# Patient Record
Sex: FEMALE | Race: WHITE | NOT HISPANIC OR LATINO | Employment: FULL TIME | URBAN - NONMETROPOLITAN AREA
[De-identification: names, ages, dates, MRNs, and addresses within clinical notes are randomized per-mention and may not be internally consistent; named-entity substitution may affect disease eponyms.]

---

## 2017-03-22 ENCOUNTER — OFFICE VISIT (OUTPATIENT)
Dept: MEDICAL GROUP | Facility: PHYSICIAN GROUP | Age: 22
End: 2017-03-22
Payer: OTHER GOVERNMENT

## 2017-03-22 VITALS
RESPIRATION RATE: 16 BRPM | BODY MASS INDEX: 25.66 KG/M2 | DIASTOLIC BLOOD PRESSURE: 62 MMHG | WEIGHT: 154 LBS | HEART RATE: 76 BPM | OXYGEN SATURATION: 99 % | TEMPERATURE: 97.9 F | SYSTOLIC BLOOD PRESSURE: 100 MMHG | HEIGHT: 65 IN

## 2017-03-22 DIAGNOSIS — Z30.40 ENCOUNTER FOR SURVEILLANCE OF CONTRACEPTIVES: ICD-10-CM

## 2017-03-22 DIAGNOSIS — J45.40 MODERATE PERSISTENT ASTHMA WITHOUT COMPLICATION: ICD-10-CM

## 2017-03-22 PROCEDURE — 99214 OFFICE O/P EST MOD 30 MIN: CPT | Performed by: NURSE PRACTITIONER

## 2017-03-22 RX ORDER — NORELGESTROMIN AND ETHINYL ESTRADIOL 35; 150 UG/MG; UG/MG
1 PATCH TRANSDERMAL
Qty: 12 PATCH | Refills: 4 | Status: SHIPPED
Start: 2017-03-22 | End: 2019-03-05

## 2017-03-22 RX ORDER — NORELGESTROMIN AND ETHINYL ESTRADIOL 35; 150 UG/MG; UG/MG
1 PATCH TRANSDERMAL
Qty: 12 PATCH | Refills: 4 | Status: SHIPPED | OUTPATIENT
Start: 2017-03-22 | End: 2017-03-22 | Stop reason: SDUPTHER

## 2017-03-22 RX ORDER — FLUTICASONE PROPIONATE 44 UG/1
2 AEROSOL, METERED RESPIRATORY (INHALATION) 2 TIMES DAILY
Qty: 3 INHALER | Refills: 3 | Status: SHIPPED
Start: 2017-03-22 | End: 2017-12-01

## 2017-03-22 RX ORDER — FLUTICASONE PROPIONATE 44 UG/1
2 AEROSOL, METERED RESPIRATORY (INHALATION) 2 TIMES DAILY
Qty: 3 INHALER | Refills: 3 | Status: SHIPPED | OUTPATIENT
Start: 2017-03-22 | End: 2017-03-22 | Stop reason: SDUPTHER

## 2017-03-22 RX ORDER — ALBUTEROL SULFATE 90 UG/1
2 AEROSOL, METERED RESPIRATORY (INHALATION) EVERY 6 HOURS PRN
Qty: 2 INHALER | Refills: 1 | Status: SHIPPED
Start: 2017-03-22 | End: 2017-12-01

## 2017-03-22 RX ORDER — NORELGESTROMIN AND ETHINYL ESTRADIOL 35; 150 UG/MG; UG/MG
1 PATCH TRANSDERMAL
Qty: 12 PATCH | Refills: 4 | Status: SHIPPED | OUTPATIENT
Start: 2017-03-22

## 2017-03-22 RX ORDER — ALBUTEROL SULFATE 90 UG/1
2 AEROSOL, METERED RESPIRATORY (INHALATION) EVERY 6 HOURS PRN
Qty: 2 INHALER | Refills: 1 | Status: SHIPPED | OUTPATIENT
Start: 2017-03-22 | End: 2017-03-22 | Stop reason: SDUPTHER

## 2017-03-22 NOTE — PATIENT INSTRUCTIONS
Birth control refilled, take as prescribed    Restart taking Flovent, 2 puffs twice a day, rinse out mouth after use    Refilled albuterol, take as directed, the goal is to use this rarely    Follow up with me in 3 months

## 2017-03-22 NOTE — MR AVS SNAPSHOT
"        Kirsty Frantz   3/22/2017 4:00 PM   Office Visit   MRN: [id number]    Department:  [facility]   Dept Phone:  [phone]    Description:  Female : 1995   Provider:  RAJIV Napoles           Reason for Visit     Medication Refill BC patch, albuterol      Allergies as of 3/22/2017     Allergen Noted Reactions    Cillins [Penicillins] 2011       Codeine 2011       Sulfa Drugs 2011         You were diagnosed with     Encounter for surveillance of contraceptives   [612362]       Moderate persistent asthma without complication   [978807]         Vital Signs     Blood Pressure Pulse Temperature Respirations Height Weight    100/62 mmHg 76 36.6 °C (97.9 °F) 16 1.651 m (5' 5\") 69.854 kg (154 lb)    Body Mass Index Oxygen Saturation Last Menstrual Period Smoking Status          25.63 kg/m2 99% 2017 Never Smoker         Basic Information     Date Of Birth Sex Race Ethnicity Preferred Language    1995 Female White Non- English      Problem List              ICD-10-CM Priority Class Noted - Resolved    Family history of early CAD Z82.49   3/7/2011 - Present    History of depression Z86.59   3/7/2011 - Present    Moderate persistent asthma J45.40   Unknown - Present    Bilateral chronic knee pain M25.561, M25.562, G89.29   10/22/2013 - Present    Acute low back pain M54.5   2014 - Present    Encounter for routine gynecological examination Z01.419   2015 - Present    Moderate cervical dysplasia N87.1   2016 - Present    Encounter for surveillance of contraceptives Z30.40   2016 - Present    Bilateral foot pain M79.671, M79.672   2016 - Present    Need for HPV vaccination Z23   2016 - Present    Memory changes R41.3   10/12/2016 - Present      Health Maintenance        Date Due Completion Dates    IMM HEP B VACCINE (1 of 3 - Primary Series) 1995 ---    IMM HEP A VACCINE (1 of 2 - Standard Series) 1994 ---    IMM VARICELLA " (CHICKENPOX) VACCINE (1 of 2 - 2 Dose Adolescent Series) 2/25/2006 ---    IMM DTaP/Tdap/Td Vaccine (1 - Tdap) 2/25/2012 ---    IMM PNEUMOCOCCAL 19-64 (ADULT) MEDIUM RISK SERIES (1 of 1 - PPSV23) 2/25/2012 ---    IMM INFLUENZA (1) 9/1/2016 ---    PAP SMEAR 1/12/2018 1/12/2015            Current Immunizations     HPV 9-VALENT VACCINE (GARDASIL 9) 8/5/2016 11:15 AM, 4/1/2016, 2/1/2016  4:02 PM      Below and/or attached are the medications your provider expects you to take. Review all of your home medications and newly ordered medications with your provider and/or pharmacist. Follow medication instructions as directed by your provider and/or pharmacist. Please keep your medication list with you and share with your provider. Update the information when medications are discontinued, doses are changed, or new medications (including over-the-counter products) are added; and carry medication information at all times in the event of emergency situations     Allergies:  CILLINS - (reactions not documented)     CODEINE - (reactions not documented)     SULFA DRUGS - (reactions not documented)               Medications  Valid as of: March 22, 2017 -  4:01 PM    Generic Name Brand Name Tablet Size Instructions for use    Albuterol Sulfate (Aero Soln) albuterol 108 (90 BASE) MCG/ACT Inhale 2 Puffs by mouth every 6 hours as needed for Shortness of Breath.        Cetirizine HCl (Tab) ZYRTEC 10 MG Take 1 Tab by mouth every day.        Fluticasone Propionate (Suspension) FLONASE 50 MCG/ACT Spray 2 Sprays in nose as needed. Each Nostril        Fluticasone Propionate (Suspension) FLONASE 50 MCG/ACT Spray 1 Spray in nose 2 times a day.        Fluticasone Propionate HFA (Aerosol) FLOVENT HFA 44 MCG/ACT Inhale 2 Puffs by mouth 2 times a day.        Montelukast Sodium (Tab) SINGULAIR 10 MG Take 1 Tab by mouth every day.        Norelgestromin-Eth Estradiol (PATCH WEEKLY) ORTHO EVRA 150-35 MCG/24HR Apply 1 Patch to skin as directed every 7  days.        .                 Medicines prescribed today were sent to:     [facility] - [place], [place] - [address]    [address] [place] [address]    Phone: [phone] Fax: [phone]    Open 24 Hours?: No    [facility] [place] [facility] [place] - [address]    [address] [place] [address]    Phone: [phone] Fax: [phone]    Open 24 Hours?: No    [facility] - [place], [place] - [address]    [address] [place] [address]    Phone: [phone] Fax: [phone]    Open 24 Hours?: No      Medication refill instructions:       If your prescription bottle indicates you have medication refills left, it is not necessary to call your provider’s office. Please contact your pharmacy and they will refill your medication.    If your prescription bottle indicates you do not have any refills left, you may request refills at any time through one of the following ways: The online [employer] system (except Urgent Care), by calling your provider’s office, or by asking your pharmacy to contact your provider’s office with a refill request. Medication refills are processed only during regular business hours and may not be available until the next business day. Your provider may request additional information or to have a follow-up visit with you prior to refilling your medication.   *Please Note: Medication refills are assigned a new Rx number when refilled electronically. Your pharmacy may indicate that no refills were authorized even though a new prescription for the same medication is available at the pharmacy. Please request the medicine by name with the pharmacy before contacting your provider for a refill.        Instructions    Birth control refilled, take as prescribed    Restart taking Flovent, 2 puffs twice a day, rinse out mouth after use    Refilled albuterol, take as directed, the goal is to use this rarely    Follow up with me in 3  months          [email or web address] Access Code: [email or web address]  Expires: 4/21/2017  4:01 PM    [email or web address]  A secure, online tool to manage your health information     [employer]’s [email or web address]® is a secure, online tool that connects you to your personalized health information from the privacy of your home -- day or night - making it very easy for you to manage your healthcare. Once the activation process is completed, you can even access your medical information using the [email or web address] john, which is available for free in the Apple John store or Google Play store.     [email or web address] provides the following levels of access (as shown below):   My Chart Features   [facility] Primary Care Doctor [facility]  Specialists [facility]  Urgent  Care Non-[facility]  Primary Care  Doctor   Email your healthcare team securely and privately 24/7 X X X    Manage appointments: schedule your next appointment; view details of past/upcoming appointments X      Request prescription refills. X      View recent personal medical records, including lab and immunizations X X X X   View health record, including health history, allergies, medications X X X X   Read reports about your outpatient visits, procedures, consult and ER notes X X X X   See your discharge summary, which is a recap of your hospital and/or ER visit that includes your diagnosis, lab results, and care plan. X X       How to register for [email or web address]:  1. Go to  [email or web address].  2. Click on the Sign Up Now box, which takes you to the New Member Sign Up page. You will need to provide the following information:  a. Enter your [email or web address] Access Code exactly as it appears at the top of this page. (You will not need to use this code after you’ve completed the sign-up process. If you do not sign up before the expiration date, you must request a new code.)   b. Enter your date of birth.   c. Enter your home email address.   d. Click Submit, and follow the next screen’s instructions.  3. Create a [email or web address] ID. This will  be your [employer] login ID and cannot be changed, so think of one that is secure and easy to remember.  4. Create a [employer] password. You can change your password at any time.  5. Enter your Password Reset Question and Answer. This can be used at a later time if you forget your password.   6. Enter your e-mail address. This allows you to receive e-mail notifications when new information is available in [employer].  7. Click Sign Up. You can now view your health information.    For assistance activating your [employer] account, call [phone]

## 2017-03-23 NOTE — ASSESSMENT & PLAN NOTE
Patient here for follow-up visit, needs refills on her birth control patch, Ortho Evra. She is tolerating this patch well with no significant bothersome side effects. She has been consistent, and there has been no lapse in taking this patch. She is up-to-date with Pap smears, and is followed by gynecology. Medication was refilled for her.

## 2017-03-23 NOTE — ASSESSMENT & PLAN NOTE
This is a chronic condition, stable and controlled she states with albuterol inhaler as well as Flovent. She is requesting refills on her albuterol inhaler, she would like one to keep it in her purse and one to keep at work. When asked how often she uses her albuterol inhaler, she could not give me an exact answer, but states she uses a multiple times per day especially at work, as she works in a very danielle environment. I did note on her medication list that she has not refilled her Flovent since 2012. She finally does admit that she does not take this regularly and does need a refill. I did  her that if she is only relying on her albuterol and she is using it more than twice a week, her asthma is not controlled.  I would like to have her resume her Flovent, take it consistently as previously prescribed, with the goal of using her albuterol rarely. Both of her inhalers were refilled. I would like her to follow-up with me in 3 months. I've also encouraged her to take her allergy medications including her Zyrtec, Flonase and Singulair as prescribed especially if her allergies are exacerbated right now. Patient verbalized understanding and agrees with the plan.

## 2017-03-23 NOTE — PROGRESS NOTES
Chief Complaint   Patient presents with   • Medication Refill     BC patch, albuterol         This is a 22 y.o.female patient that presents today with the following: Follow-up visit, medication refills, asthma    Moderate persistent asthma  This is a chronic condition, stable and controlled she states with albuterol inhaler as well as Flovent. She is requesting refills on her albuterol inhaler, she would like one to keep it in her purse and one to keep at work. When asked how often she uses her albuterol inhaler, she could not give me an exact answer, but states she uses a multiple times per day especially at work, as she works in a very danielle environment. I did note on her medication list that she has not refilled her Flovent since 2012. She finally does admit that she does not take this regularly and does need a refill. I did  her that if she is only relying on her albuterol and she is using it more than twice a week, her asthma is not controlled.  I would like to have her resume her Flovent, take it consistently as previously prescribed, with the goal of using her albuterol rarely. Both of her inhalers were refilled. I would like her to follow-up with me in 3 months. I've also encouraged her to take her allergy medications including her Zyrtec, Flonase and Singulair as prescribed especially if her allergies are exacerbated right now. Patient verbalized understanding and agrees with the plan.    Encounter for surveillance of contraceptives  Patient here for follow-up visit, needs refills on her birth control patch, Ortho Evra. She is tolerating this patch well with no significant bothersome side effects. She has been consistent, and there has been no lapse in taking this patch. She is up-to-date with Pap smears, and is followed by gynecology. Medication was refilled for her.      No visits with results within 1 Month(s) from this visit.  Latest known visit with results is:    Office Visit on 02/18/2016    Component Date Value   • POC Urine Pregnancy Test 02/18/2016 Negative    • Internal Control Positive 02/18/2016 Positive    • Internal Control Negative 02/18/2016 Negative          clinical course has been stable    Past Medical History   Diagnosis Date   • ASTHMA    • Syncopes, vasovagal    • Migraine    • Environmental allergies      mold, dust, grass   • Depression      on OCPs   • Anxiety    • Acute low back pain 8/20/2014     X 1 week Cramping, suprpubic, L=R Pain in low back, most of the time 10/10 at it's worst 5/10 at it's best Tried ?asa, one time, before work - didn't help No BM changes Periods usu 4-7 days, usu once/month About 2 weeks ago, had one day of bleeding Sexually active, use condoms 100% of the time, one broke around 8/4       Past Surgical History   Procedure Laterality Date   • Cholecystectomy         Family History   Problem Relation Age of Onset   • Diabetes Mother    • Heart Disease Mother      MI- age 46, 37 yo   • Diabetes Maternal Grandmother    • Heart Disease Maternal Grandmother    • Diabetes Maternal Grandfather    • Heart Disease Maternal Grandfather    • Hypertension Maternal Grandmother    • Hypertension Maternal Grandfather    • Hypertension Father    • Hyperlipidemia Father    • Cancer Paternal Grandfather    • Stroke Maternal Grandmother    • Stroke Maternal Grandfather        Cillins; Codeine; and Sulfa drugs    Current Outpatient Prescriptions Ordered in [facility]   Medication Sig Dispense Refill   • norelgestromin-ethinyl estradiol (ORTHO EVRA) 150-35 MCG/24HR patch Apply 1 Patch to skin as directed every 7 days. 12 Patch 4   • norelgestromin-ethinyl estradiol (ORTHO EVRA) 150-35 MCG/24HR patch Apply 1 Patch to skin as directed every 7 days. 12 Patch 4   • albuterol 108 (90 BASE) MCG/ACT Aero Soln inhalation aerosol Inhale 2 Puffs by mouth every 6 hours as needed for Shortness of Breath. 2 Inhaler 1   • fluticasone (FLOVENT HFA) 44 MCG/ACT Aerosol Inhale 2 Puffs by mouth 2  "times a day. 3 Inhaler 3   • fluticasone (FLONASE ALLERGY RELIEF) 50 MCG/ACT nasal spray Spray 1 Spray in nose 2 times a day. 16 g 0   • cetirizine (ZYRTEC) 10 MG TABS Take 1 Tab by mouth every day. 30 Tab 3   • montelukast (SINGULAIR) 10 MG TABS Take 1 Tab by mouth every day. 30 Tab 11   • fluticasone (FLONASE) 50 MCG/ACT nasal spray Spray 2 Sprays in nose as needed. Each Nostril (Patient not taking: Reported on 10/12/2016) 1 Bottle 5     No current Epic-ordered facility-administered medications on file.       Constitutional ROS: No unexpected change in weight, No weakness, No unexplained fevers, sweats, or chills  Neck ROS: No lumps or masses, No swollen glands, No significant pain in neck  Pulmonary ROS: Positive per history of present illness  Cardiovascular ROS: No chest pain, No edema, No palpitations  Gastrointestinal ROS: No abdominal pain, No nausea, vomiting, diarrhea, or constipation, no blood in stool  Musculoskeletal/Extremities ROS: No clubbing, No cyanosis, No pain, redness or swelling on the joints  Neurologic ROS: Normal development, No seizures, No weakness  Genitourinary ROS: Positive per history of present illness    Physical exam:  /62 mmHg  Pulse 76  Temp(Src) 36.6 °C (97.9 °F)  Resp 16  Ht 1.651 m (5' 5\")  Wt 69.854 kg (154 lb)  BMI 25.63 kg/m2  SpO2 99%  LMP 03/08/2017  General Appearance: Young female, alert, no distress, well-nourished, well-groomed  Skin: Skin color, texture, turgor normal. No rashes or lesions.  Lungs: negative findings: normal respiratory rate and rhythm, lungs clear to auscultation  Heart: negative. RRR without murmur, gallop, or rubs.  No ectopy.  Abdomen: Abdomen soft, non-tender. BS normal. No masses,  No organomegaly  Musculoskeletal: negative  Neurologic: intact, oriented, mood appropriate, judgment intact. Cranial nerves II-12 grossly intact    Medical decision making/discussion: Patient here for follow-up visit and medication refills. She is to " restart her Flovent as previously prescribed, this was refilled for her. She is also to use her albuterol as prescribed but the goal of using this rarely what she is taking her Flovent consistently. She is to follow-up with me in 3 months. Ortho Evra patch was refilled as well, she is to take this as prescribed. She is to continue following with gynecology. She is to continue healthy living, including healthy diet, regular physical activity, and maintaining a healthy weight.    Kirsty was seen today for medication refill.    Diagnoses and all orders for this visit:    Encounter for surveillance of contraceptives    -     norelgestromin-ethinyl estradiol (ORTHO EVRA) 150-35 MCG/24HR patch; Apply 1 Patch to skin as directed every 7 days.    Moderate persistent asthma without complication  -     albuterol 108 (90 BASE) MCG/ACT Aero Soln inhalation aerosol; Inhale 2 Puffs by mouth every 6 hours as needed for Shortness of Breath.  -     fluticasone (FLOVENT HFA) 44 MCG/ACT Aerosol; Inhale 2 Puffs by mouth 2 times a day.          Please note that this dictation was created using voice recognition software. I have made every reasonable attempt to correct obvious errors, but I expect that there are errors of grammar and possibly content that I did not discover before finalizing the note.

## 2017-04-17 ENCOUNTER — OFFICE VISIT (OUTPATIENT)
Dept: URGENT CARE | Facility: PHYSICIAN GROUP | Age: 22
End: 2017-04-17
Payer: OTHER GOVERNMENT

## 2017-04-17 VITALS
WEIGHT: 155 LBS | DIASTOLIC BLOOD PRESSURE: 66 MMHG | BODY MASS INDEX: 24.91 KG/M2 | HEART RATE: 85 BPM | RESPIRATION RATE: 16 BRPM | HEIGHT: 66 IN | SYSTOLIC BLOOD PRESSURE: 94 MMHG | TEMPERATURE: 99.3 F | OXYGEN SATURATION: 98 %

## 2017-04-17 DIAGNOSIS — R11.0 NAUSEA: ICD-10-CM

## 2017-04-17 DIAGNOSIS — L73.9 FOLLICULITIS: ICD-10-CM

## 2017-04-17 LAB
FLUAV+FLUBV AG SPEC QL IA: NEGATIVE
INT CON NEG: NEGATIVE
INT CON POS: POSITIVE

## 2017-04-17 PROCEDURE — 87804 INFLUENZA ASSAY W/OPTIC: CPT | Performed by: FAMILY MEDICINE

## 2017-04-17 PROCEDURE — 99214 OFFICE O/P EST MOD 30 MIN: CPT | Performed by: FAMILY MEDICINE

## 2017-04-17 RX ORDER — ONDANSETRON 4 MG/1
4 TABLET, ORALLY DISINTEGRATING ORAL EVERY 8 HOURS PRN
Qty: 10 TAB | Refills: 0 | Status: SHIPPED | OUTPATIENT
Start: 2017-04-17 | End: 2017-12-01

## 2017-04-17 RX ORDER — ONDANSETRON 4 MG/1
8 TABLET, ORALLY DISINTEGRATING ORAL ONCE
Status: COMPLETED | OUTPATIENT
Start: 2017-04-17 | End: 2017-04-17

## 2017-04-17 RX ADMIN — ONDANSETRON 8 MG: 4 TABLET, ORALLY DISINTEGRATING ORAL at 19:04

## 2017-04-17 NOTE — Clinical Note
April 17, 2017         Patient: Kirsty Landry   YOB: 1995   Date of Visit: 4/17/2017           To Whom it May Concern:    Kirsty Landry was seen in my clinic on 4/17/2017. She may return to work on Wednesday.    If you have any questions or concerns, please don't hesitate to call.        Sincerely,           Elroy Bush M.D.  Electronically Signed

## 2017-04-18 NOTE — PROGRESS NOTES
"  Chief Complaint   Patient presents with   • Nausea     C/o N/V, diarrhea, headache, weakness due to not being able to eat, poss fever, chills x2 days   2.  Rash on legs        Emesis  This is a new problem. The current episode started in the past 2 days. The problem occurs 3 times per day. The problem has been unchanged.  no blood in emesis.  Associated symptoms include subjective fever/chills, headache, diarrhea. Pertinent negatives include no abdominal pain. Nothing aggravates the symptoms. There are no known risk factors. Patient has tried nothing for the symptoms. There is no history of inflammatory bowel disease.          2. Rash  This is a new problem. The current episode started in the past 7 days. The problem is unchanged. Pain location:    LEGS. The rash is characterized by redness and itchiness. she recently had a tattoo on the left leg.   Past treatments include: none.          Past Medical History   Diagnosis Date   • ASTHMA    • Syncopes, vasovagal    • Migraine    • Environmental allergies      mold, dust, grass   • Depression      on OCPs   • Anxiety    •               Social History   Substance Use Topics   • Smoking status: Never Smoker    • Smokeless tobacco: Never Used   • Alcohol Use: No                  Review of Systems   Constitutional: Negative for fever, chills and weight loss.   Respiratory: Negative for cough and wheezing.    Cardiovascular: Negative for chest pain.   Gastrointestinal:   Negative for  blood in stool.   Neurological: Negative for dizziness and headaches.   All other systems reviewed and are negative.         Objective:     Blood pressure 94/66, pulse 85, temperature 37.4 °C (99.3 °F), resp. rate 16, height 1.689 m (5' 6.5\"), weight 70.308 kg (155 lb), last menstrual period 03/08/2017, SpO2 98 %.      Physical Exam   Constitutional: pt is oriented to person, place, and time and appears well-developed. No distress.   HENT:   Head: Normocephalic and atraumatic. "   Mouth/Throat: No oropharyngeal exudate.   Eyes: Conjunctivae are normal. No scleral icterus.   Cardiovascular: Normal rate, regular rhythm and normal heart sounds.    Pulmonary/Chest: Effort normal and breath sounds normal. No respiratory distress. Pt has no wheezes. Pt has no rales.   Abdominal: Normal appearance and bowel sounds are normal. There is no splenomegaly or hepatomegaly. There is no tenderness. There is no rebound, no guarding, no CVA tenderness and no tenderness at McBurney's point.   Lymphadenopathy:     Pt has no cervical adenopathy.   Neurological: pt is alert and oriented to person, place, and time.   Skin: Skin: Skin is warm. Rash (several small papules and pustules on an erythematous base on left and right lower ext)  Psychiatric:  behavior is normal.   Nursing note and vitals reviewed.              Assessment/Plan:         1. Viral gastroenteritis  Nausea improved after zofran  Able to pass PO challenge.      BRAT diet x 24 hr  Push fluids  - ondansetron (ZOFRAN) 4 MG Tab tablet; Take 1 Tab by mouth every four hours as needed for Nausea/Vomiting.  Dispense: 20 Tab; Refill: 1    F/u in 2 d if sx not improved         2. Folliculitis     - mupirocin (BACTROBAN) 2 % Ointment; Apply 1 Application to affected area(s) 2 times a day.  Dispense: 1 Tube; Refill: 0

## 2017-07-02 ENCOUNTER — OFFICE VISIT (OUTPATIENT)
Dept: URGENT CARE | Facility: PHYSICIAN GROUP | Age: 22
End: 2017-07-02
Payer: OTHER GOVERNMENT

## 2017-07-02 VITALS
HEART RATE: 90 BPM | BODY MASS INDEX: 24.91 KG/M2 | HEIGHT: 66 IN | OXYGEN SATURATION: 99 % | WEIGHT: 155 LBS | SYSTOLIC BLOOD PRESSURE: 104 MMHG | TEMPERATURE: 99.1 F | DIASTOLIC BLOOD PRESSURE: 68 MMHG

## 2017-07-02 DIAGNOSIS — J98.8 RTI (RESPIRATORY TRACT INFECTION): ICD-10-CM

## 2017-07-02 DIAGNOSIS — J45.41 MODERATE PERSISTENT ASTHMA WITH ACUTE EXACERBATION: ICD-10-CM

## 2017-07-02 PROCEDURE — 99214 OFFICE O/P EST MOD 30 MIN: CPT | Performed by: PHYSICIAN ASSISTANT

## 2017-07-02 RX ORDER — BENZONATATE 100 MG/1
100 CAPSULE ORAL 3 TIMES DAILY PRN
Qty: 30 CAP | Refills: 0 | Status: SHIPPED | OUTPATIENT
Start: 2017-07-02 | End: 2017-12-01

## 2017-07-02 RX ORDER — AZITHROMYCIN 250 MG/1
TABLET, FILM COATED ORAL
Qty: 6 TAB | Refills: 0 | Status: SHIPPED | OUTPATIENT
Start: 2017-07-02 | End: 2017-12-01

## 2017-07-02 ASSESSMENT — ENCOUNTER SYMPTOMS
SORE THROAT: 1
MUSCULOSKELETAL NEGATIVE: 1
PALPITATIONS: 0
FEVER: 0
DIZZINESS: 0
COUGH: 1
HEADACHES: 1
SHORTNESS OF BREATH: 0
GASTROINTESTINAL NEGATIVE: 1
WHEEZING: 1
CHILLS: 0
SPUTUM PRODUCTION: 1

## 2017-07-02 NOTE — MR AVS SNAPSHOT
"        Kirsty Frantz   2017 1:10 PM   Office Visit   MRN: [id number]    Department:  [facility]   Dept Phone:  [phone]    Description:  Female : 1995   Provider:  Rodney Marx PA-C           Reason for Visit     Cough x 2 days, sinus pain, sore throat       Allergies as of 2017     Allergen Noted Reactions    Cillins [Penicillins] 2011       Codeine 2011       Sulfa Drugs 2011         You were diagnosed with     RTI (respiratory tract infection)   [310543]       Moderate persistent asthma with acute exacerbation   [8546767]         Vital Signs     Blood Pressure Pulse Temperature Height Weight Body Mass Index    104/68 mmHg 90 37.3 °C (99.1 °F) 1.676 m (5' 5.98\") 70.308 kg (155 lb) 25.03 kg/m2    Oxygen Saturation Smoking Status                99% Never Smoker           Basic Information     Date Of Birth Sex Race Ethnicity Preferred Language    1995 Female White Non- English      Problem List              ICD-10-CM Priority Class Noted - Resolved    Family history of early CAD Z82.49   3/7/2011 - Present    History of depression Z86.59   3/7/2011 - Present    Moderate persistent asthma J45.40   Unknown - Present    Bilateral chronic knee pain M25.561, M25.562, G89.29   10/22/2013 - Present    Acute low back pain M54.5   2014 - Present    Encounter for routine gynecological examination Z01.419   2015 - Present    Moderate cervical dysplasia N87.1   2016 - Present    Encounter for surveillance of contraceptives Z30.40   2016 - Present    Bilateral foot pain M79.671, M79.672   2016 - Present    Need for HPV vaccination Z23   2016 - Present    Memory changes R41.3   10/12/2016 - Present      Health Maintenance        Date Due Completion Dates    IMM HEP B VACCINE (1 of 3 - Primary Series) 1995 ---    IMM HEP A VACCINE (1 of 2 - Standard Series) 1994 ---    IMM VARICELLA (CHICKENPOX) VACCINE (1 of 2 - 2 Dose Adolescent " Series) 2/25/2006 ---    IMM DTaP/Tdap/Td Vaccine (1 - Tdap) 2/25/2012 ---    IMM PNEUMOCOCCAL 19-64 (ADULT) MEDIUM RISK SERIES (1 of 1 - PPSV23) 2/25/2012 ---    IMM INFLUENZA (1) 9/1/2017 ---    PAP SMEAR 1/12/2018 1/12/2015            Current Immunizations     HPV 9-VALENT VACCINE (GARDASIL 9) 8/5/2016 11:15 AM, 4/1/2016, 2/1/2016  4:02 PM      Below and/or attached are the medications your provider expects you to take. Review all of your home medications and newly ordered medications with your provider and/or pharmacist. Follow medication instructions as directed by your provider and/or pharmacist. Please keep your medication list with you and share with your provider. Update the information when medications are discontinued, doses are changed, or new medications (including over-the-counter products) are added; and carry medication information at all times in the event of emergency situations     Allergies:  CILLINS - (reactions not documented)     CODEINE - (reactions not documented)     SULFA DRUGS - (reactions not documented)               Medications  Valid as of: July 02, 2017 -  1:25 PM    Generic Name Brand Name Tablet Size Instructions for use    Albuterol Sulfate (Aero Soln) albuterol 108 (90 BASE) MCG/ACT Inhale 2 Puffs by mouth every 6 hours as needed for Shortness of Breath.        Azithromycin (Tab) ZITHROMAX 250 MG Z-caren, Use as directed.        Benzonatate (Cap) TESSALON 100 MG Take 1 Cap by mouth 3 times a day as needed for Cough.        Bismuth Subsalicylate   Take  by mouth.        Cetirizine HCl (Tab) ZYRTEC 10 MG Take 1 Tab by mouth every day.        Fluticasone Propionate (Suspension) FLONASE 50 MCG/ACT Spray 2 Sprays in nose as needed. Each Nostril        Fluticasone Propionate (Suspension) FLONASE 50 MCG/ACT Spray 1 Spray in nose 2 times a day.        Fluticasone Propionate HFA (Aerosol) FLOVENT HFA 44 MCG/ACT Inhale 2 Puffs by mouth 2 times a day.        Montelukast Sodium (Tab) SINGULAIR  10 MG Take 1 Tab by mouth every day.        Mupirocin (Ointment) BACTROBAN 2 % Apply 1 Application to affected area(s) 2 times a day.        Norelgestromin-Eth Estradiol (PATCH WEEKLY) ORTHO EVRA 150-35 MCG/24HR Apply 1 Patch to skin as directed every 7 days.        Norelgestromin-Eth Estradiol (PATCH WEEKLY) ORTHO EVRA 150-35 MCG/24HR Apply 1 Patch to skin as directed every 7 days.        Ondansetron (TABLET DISPERSIBLE) ZOFRAN ODT 4 MG Take 1 Tab by mouth every 8 hours as needed for Nausea/Vomiting.        Pseudoeph-Doxylamine-DM-APAP   Take  by mouth.        Pseudoephedrine-APAP-DM   Take  by mouth.        .                 Medicines prescribed today were sent to:     [facility] [address] [place] [address] - [address] [address]    Phone: [phone] Fax: [phone]    Open 24 Hours?: No      Medication refill instructions:       If your prescription bottle indicates you have medication refills left, it is not necessary to call your provider’s office. Please contact your pharmacy and they will refill your medication.    If your prescription bottle indicates you do not have any refills left, you may request refills at any time through one of the following ways: The online [employer] system (except Urgent Care), by calling your provider’s office, or by asking your pharmacy to contact your provider’s office with a refill request. Medication refills are processed only during regular business hours and may not be available until the next business day. Your provider may request additional information or to have a follow-up visit with you prior to refilling your medication.   *Please Note: Medication refills are assigned a new Rx number when refilled electronically. Your pharmacy may indicate that no refills were authorized even though a new prescription for the same medication is available at the pharmacy. Please request the medicine by name with the pharmacy before contacting  your provider for a refill.           [employer] Access Code: [email or web address]  Expires: 8/1/2017  1:25 PM    Masc[employer]ube  A secure, online tool to manage your health information     [employer]’s MascotaNube® is a secure, online tool that connects you to your personalized health information from the privacy of your home -- day or night - making it very easy for you to manage your healthcare. Once the activation process is completed, you can even access your medical information using the MascotaNube john, which is available for free in the [employer] John store or Google Play store.     MascotaNube provides the following levels of access (as shown below):   My Chart Features   [facility] Doctor [facility]  Specialists [facility]  Urgent  Care Non-[facility]  Primary Care  Doctor   Email your healthcare team securely and privately 24/7 X X X    Manage appointments: schedule your next appointment; view details of past/upcoming appointments X      Request prescription refills. X      View recent personal medical records, including lab and immunizations X X X X   View health record, including health history, allergies, medications X X X X   Read reports about your outpatient visits, procedures, consult and ER notes X X X X   See your discharge summary, which is a recap of your hospital and/or ER visit that includes your diagnosis, lab results, and care plan. X X       How to register for Masc[employer]ube:  1. Go to  [email or web address].  2. Click on the Sign Up Now box, which takes you to the New Member Sign Up page. You will need to provide the following information:  a. Enter your M[employer]ube Access Code exactly as it appears at the top of this page. (You will not need to use this code after you’ve completed the sign-up process. If you do not sign up before the expiration date, you must request a new code.)   b. Enter your date of birth.   c. Enter your home email address.   d. Click Submit, and follow the next screen’s instructions.  3. Create a  MyChart ID. This will be your [employer] login ID and cannot be changed, so think of one that is secure and easy to remember.  4. Create a [employer] password. You can change your password at any time.  5. Enter your Password Reset Question and Answer. This can be used at a later time if you forget your password.   6. Enter your e-mail address. This allows you to receive e-mail notifications when new information is available in [employer].  7. Click Sign Up. You can now view your health information.    For assistance activating your [employer] account, call [phone]

## 2017-07-02 NOTE — Clinical Note
July 2, 2017         Patient: Kirsty Landry   YOB: 1995   Date of Visit: 7/2/2017           To Whom it May Concern:    Kirsty Landry was seen in my clinic on 7/2/2017. She may return to work on Weds July 5th.    If you have any questions or concerns, please don't hesitate to call.        Sincerely,           Rodney Marx PA-C  Electronically Signed

## 2017-07-02 NOTE — PROGRESS NOTES
Subjective:      Kirsty Landry is a 22 y.o. female who presents with Cough            Cough  This is a new problem. The current episode started in the past 7 days. The problem has been gradually worsening. The problem occurs every few minutes. The cough is productive of sputum. Associated symptoms include headaches, a sore throat and wheezing. Pertinent negatives include no chest pain, chills, ear pain, fever or shortness of breath. She has tried a beta-agonist inhaler and OTC cough suppressant for the symptoms. The treatment provided mild relief. Her past medical history is significant for asthma and bronchitis. There is no history of pneumonia.       PMH:  has a past medical history of ASTHMA; Syncopes, vasovagal; Migraine; Environmental allergies; Depression; Anxiety; and Acute low back pain (8/20/2014).  MEDS:   Current outpatient prescriptions:   •  norelgestromin-ethinyl estradiol (ORTHO EVRA) 150-35 MCG/24HR patch, Apply 1 Patch to skin as directed every 7 days., Disp: 12 Patch, Rfl: 4  •  Pseudoephedrine-APAP-DM (DAYQUIL PO), Take  by mouth., Disp: , Rfl:   •  Pseudoeph-Doxylamine-DM-APAP (NYQUIL PO), Take  by mouth., Disp: , Rfl:   •  Bismuth Subsalicylate (PEPTO-BISMOL PO), Take  by mouth., Disp: , Rfl:   •  ondansetron (ZOFRAN ODT) 4 MG TABLET DISPERSIBLE, Take 1 Tab by mouth every 8 hours as needed for Nausea/Vomiting., Disp: 10 Tab, Rfl: 0  •  mupirocin (BACTROBAN) 2 % Ointment, Apply 1 Application to affected area(s) 2 times a day., Disp: 1 Tube, Rfl: 0  •  norelgestromin-ethinyl estradiol (ORTHO EVRA) 150-35 MCG/24HR patch, Apply 1 Patch to skin as directed every 7 days., Disp: 12 Patch, Rfl: 4  •  albuterol 108 (90 BASE) MCG/ACT Aero Soln inhalation aerosol, Inhale 2 Puffs by mouth every 6 hours as needed for Shortness of Breath., Disp: 2 Inhaler, Rfl: 1  •  fluticasone (FLOVENT HFA) 44 MCG/ACT Aerosol, Inhale 2 Puffs by mouth 2 times a day., Disp: 3 Inhaler, Rfl: 3  •  fluticasone (FLONASE ALLERGY  "RELIEF) 50 MCG/ACT nasal spray, Spray 1 Spray in nose 2 times a day., Disp: 16 g, Rfl: 0  •  cetirizine (ZYRTEC) 10 MG TABS, Take 1 Tab by mouth every day., Disp: 30 Tab, Rfl: 3  •  montelukast (SINGULAIR) 10 MG TABS, Take 1 Tab by mouth every day., Disp: 30 Tab, Rfl: 11  •  fluticasone (FLONASE) 50 MCG/ACT nasal spray, Spray 2 Sprays in nose as needed. Each Nostril (Patient not taking: Reported on 10/12/2016), Disp: 1 Bottle, Rfl: 5  ALLERGIES:   Allergies   Allergen Reactions   • Cillins [Penicillins]    • Codeine    • Sulfa Drugs      SURGHX:   Past Surgical History   Procedure Laterality Date   • Cholecystectomy       SOCHX:  reports that she has never smoked. She has never used smokeless tobacco. She reports that she does not drink alcohol or use illicit drugs.  FH: family history includes Cancer in her paternal grandfather; Diabetes in her maternal grandfather, maternal grandmother, and mother; Heart Disease in her maternal grandfather, maternal grandmother, and mother; Hyperlipidemia in her father; Hypertension in her father, maternal grandfather, and maternal grandmother; Stroke in her maternal grandfather and maternal grandmother.      Review of Systems   Constitutional: Negative for fever and chills.   HENT: Positive for congestion and sore throat. Negative for ear pain.    Respiratory: Positive for cough, sputum production and wheezing. Negative for shortness of breath.    Cardiovascular: Negative for chest pain, palpitations and leg swelling.   Gastrointestinal: Negative.    Musculoskeletal: Negative.    Neurological: Positive for headaches. Negative for dizziness.       Medications, Allergies, and current problem list reviewed today in Epic     Objective:     /68 mmHg  Pulse 90  Temp(Src) 37.3 °C (99.1 °F)  Ht 1.676 m (5' 5.98\")  Wt 70.308 kg (155 lb)  BMI 25.03 kg/m2  SpO2 99%     Physical Exam   Constitutional: She is oriented to person, place, and time. She appears well-developed and " well-nourished. No distress.   HENT:   Head: Normocephalic and atraumatic.   Right Ear: Tympanic membrane and external ear normal.   Left Ear: Tympanic membrane and external ear normal.   Nose: Nose normal.   Mouth/Throat: Oropharynx is clear and moist. No oropharyngeal exudate.   Eyes: Conjunctivae and EOM are normal. Pupils are equal, round, and reactive to light. Right eye exhibits no discharge. Left eye exhibits no discharge.   Neck: Normal range of motion. Neck supple. No tracheal deviation present. No thyromegaly present.   Cardiovascular: Normal rate, regular rhythm, normal heart sounds and intact distal pulses.    Pulmonary/Chest: Effort normal. No respiratory distress. She has decreased breath sounds. She has wheezes. She has no rales.   Musculoskeletal: Normal range of motion.   Lymphadenopathy:     She has no cervical adenopathy.   Neurological: She is alert and oriented to person, place, and time.   Skin: Skin is warm and dry. She is not diaphoretic.   Psychiatric: She has a normal mood and affect. Her behavior is normal. Judgment and thought content normal.   Nursing note and vitals reviewed.              Assessment/Plan:     1. RTI (respiratory tract infection)  azithromycin (ZITHROMAX) 250 MG Tab    benzonatate (TESSALON) 100 MG Cap   2. Moderate persistent asthma with acute exacerbation       PO2 99%. Likely viral  Patient was given a contingent antibiotic prescription to fill and use as directed if symptoms progressed as discussed and agreed upon.  Continue chronic asthma samara  OTC meds and conservative measures as discussed  Return to clinic or go to ED if symptoms worsen or persist. Indications for ED discussed at length. Patient voices understanding. Follow-up with your primary care provider in 3-5 days. Red flags discussed.    Please note that this dictation was created using voice recognition software. I have made every reasonable attempt to correct obvious errors, but I expect that there are  errors of grammar and possibly content that I did not discover before finalizing the note.

## 2017-11-24 ENCOUNTER — OCCUPATIONAL MEDICINE (OUTPATIENT)
Dept: URGENT CARE | Facility: PHYSICIAN GROUP | Age: 22
End: 2017-11-24
Payer: COMMERCIAL

## 2017-11-24 VITALS
WEIGHT: 163 LBS | TEMPERATURE: 97.8 F | SYSTOLIC BLOOD PRESSURE: 104 MMHG | BODY MASS INDEX: 26.2 KG/M2 | DIASTOLIC BLOOD PRESSURE: 68 MMHG | HEIGHT: 66 IN | OXYGEN SATURATION: 97 % | RESPIRATION RATE: 14 BRPM | HEART RATE: 88 BPM

## 2017-11-24 DIAGNOSIS — S39.012A STRAIN OF LUMBAR REGION, INITIAL ENCOUNTER: ICD-10-CM

## 2017-11-24 PROCEDURE — 99204 OFFICE O/P NEW MOD 45 MIN: CPT | Mod: 29 | Performed by: PHYSICIAN ASSISTANT

## 2017-11-24 NOTE — LETTER
"EMPLOYEE’S CLAIM FOR COMPENSATION/ REPORT OF INITIAL TREATMENT  FORM C-4    EMPLOYEE’S CLAIM - PROVIDE ALL INFORMATION REQUESTED   First Name  Kirsty Last Name  Frantz Birthdate                    1995                Sex  female Claim Number   Home Address  [address] Age  22 y.o. Height  1.676 m (5' 6\") Weight  73.9 kg (163 lb) [facility]     [facility] Zip  [address] Telephone  [phone] (home)    Mailing Address  [address] [facility] Zip  [address] Primary Language Spoken  English    Insurer   Third Party   [employer]   Employee's Occupation (Job Title) When Injury or Occupational Disease Occurred      Employer's Name  [employer]  Telephone  [phone]    Employer Address  [address]      Date of Injury  11/22/2017               Hour of Injury  10:30 PM Date Employer Notified  11/22/2017 Last Day of Work after Injury or Occupational Disease  11/23/2017 Supervisor to Whom Injury Reported  Bethanie   Address or Location of Accident (if applicable)  [packing [employer] [address]]   What were you doing at the time of accident? (if applicable)  packing/get treat from leads    How did this injury or occupational disease occur? (Be specific an answer in detail. Use additional sheet if necessary)  I had built a large box and set it aside for an orders.  Leads were coming around with treats for everyone.  I went t oget a treat when my foot got caught on the box and I fell oddly (sideways and down then forward onto the box) Station was crowded with trouble so it was a tight walk as it was.   If you believe that you have an occupational disease, when did you first have knowledge of the disability and it relationship to your employment?  n/a Witnesses to the Accident  Dimitry CHILDS      Nature of Injury or " Occupational Disease  Workers' Compensation  Part(s) of Body Injured or Affected  Lower Back Area (Lumbar Area & Lumbo-Sacral), ,     I certify that the above is true and correct to the best of my knowledge and that I have provided this information in order to obtain the benefits of [place]  I hereby authorize any physician, chiropractor, surgeon, practitioner, or other person, any hospital, including [facility] or [facility], any medical service organization, any insurance company, or other institution or organization to release to each other, any medical or other information, including benefits paid or payable, pertinent to this injury or disease, except information relative to diagnosis, treatment and/or counseling for AIDS, psychological conditions, alcohol or controlled substances, for which I must give specific authorization.  A Photostat of this authorization shall be as valid as the original.     Date   Place   Employee’s Signature   THIS REPORT MUST BE COMPLETED AND MAILED WITHIN 3 WORKING DAYS OF TREATMENT   Place  [facility]  Name of [facility]   Date  11/24/2017 Diagnosis  (S39.012A) Strain of lumbar region, initial encounter Is there evidence the injured employee was under the influence of alcohol and/or another controlled substance at the time of accident?   Hour  2:07 PM Description of Injury or Disease  The encounter diagnosis was Strain of lumbar region, initial encounter. No   Treatment  Rest, heat, over-the-counter anti-inflammatories, restrictions  Have you advised the patient to remain off work five days or more? No   X-Ray Findings      If Yes   From Date  To Date      From information given by the employee, together with medical evidence, can you directly connect this injury or occupational disease as job incurred?  Yes If No Full Duty  No  "Modified Duty  Yes   Is additional medical care by a physician indicated?  Yes  Comments:follow-up in one week If Modified Duty, Specify any Limitations / Restrictions  See restrictions   Do you know of any previous injury or disease contributing to this condition or occupational disease?                            No   Date  11/24/2017 Print Doctor’s Name Scar Ospina P.A.-C. I certify the employer’s copy of  this form was mailed on:   Address  [address] Insurer’s Use Only     [facility] [facility] Zip  [address]    Provider’s Tax ID Number  [id number] Telephone  Dept: [phone]        e-SignSTASCAR ROBBINS P.A.-C.   e-Signature: Dr. Jasmeet Presley, Medical Director Degree           ORIGINAL-TREATING PHYSICIAN OR CHIROPRACTOR    PAGE 2-INSURER/TPA    PAGE 3-EMPLOYER    PAGE 4-EMPLOYEE             Form C-4 (rev10/07)              BRIEF DESCRIPTION OF RIGHTS AND BENEFITS  (Pursuant to [place] 616C.050)    Notice of Injury or Occupational Disease (Incident Report Form C-1): If an injury or occupational disease (OD) arises out of and in the  course of employment, you must provide written notice to your employer as soon as practicable, but no later than 7 days after the accident or  OD. Your employer shall maintain a sufficient supply of the required forms.    Claim for Compensation (Form C-4): If medical treatment is sought, the form C-4 is available at the place of initial treatment. A completed  \"Claim for Compensation\" (Form C-4) must be filed within 90 days after an accident or OD. The treating physician or chiropractor must,  within 3 working days after treatment, complete and mail to the employer, the employer's insurer and third-party , the Claim for  Compensation.    Medical Treatment: If you require medical treatment for your on-the-job injury or OD, you may be required to select a physician or  chiropractor from a list provided by your workers’ compensation insurer, if it has " contracted with an Organization for Managed Care (MCO) or  Preferred Provider Organization (PPO) or providers of health care. If your employer has not entered into a contract with an MCO or PPO, you  may select a physician or chiropractor from the Panel of Physicians and Chiropractors. Any medical costs related to your industrial injury or  OD will be paid by your insurer.    Temporary Total Disability (TTD): If your doctor has certified that you are unable to work for a period of at least 5 consecutive days, or 5  cumulative days in a 20-day period, or places restrictions on you that your employer does not accommodate, you may be entitled to TTD  compensation.    Temporary Partial Disability (TPD): If the wage you receive upon reemployment is less than the compensation for TTD to which you are  entitled, the insurer may be required to pay you TPD compensation to make up the difference. TPD can only be paid for a maximum of 24  months.    Permanent Partial Disability (PPD): When your medical condition is stable and there is an indication of a PPD as a result of your injury or  OD, within 30 days, your insurer must arrange for an evaluation by a rating physician or chiropractor to determine the degree of your PPD. The  amount of your PPD award depends on the date of injury, the results of the PPD evaluation and your age and wage.    Permanent Total Disability (PTD): If you are medically certified by a treating physician or chiropractor as permanently and totally disabled  and have been granted a PTD status by your insurer, you are entitled to receive monthly benefits not to exceed 66 2/3% of your average  monthly wage. The amount of your PTD payments is subject to reduction if you previously received a PPD award.    Vocational Rehabilitation Services: You may be eligible for vocational rehabilitation services if you are unable to return to the job due to a  permanent physical impairment or permanent restrictions as a  result of your injury or occupational disease.    Transportation and Per Moira Reimbursement: You may be eligible for travel expenses and per moira associated with medical treatment.    Reopening: You may be able to reopen your claim if your condition worsens after claim closure.    Appeal Process: If you disagree with a written determination issued by the insurer or the insurer does not respond to your request, you may  appeal to the Department of Administration, , by following the instructions contained in your determination letter. You must  appeal the determination within 70 days from the date of the determination letter at [address], [address], [place], [place]  [address], or [address], [address], [place], [place] [address]. If you disagree with the  decision, you may appeal to the  Department of Administration, . You must file your appeal within 30 days from the date of the  decision  letter at [address], [address], [place], [place] [address], or [address], [place], [place] [address]. If you  disagree with a decision of an , you may file a petition for judicial review with the District Court. You must do so within 30  days of the Appeal Officer’s decision. You may be represented by an  at your own expense or you may contact the [facility] for possible  representation.    [place]  for Injured Workers (NAIW): If you disagree with a  decision, you may request that NAIW represent you  without charge at an  Hearing. For information regarding denial of benefits, you may contact the [facility] at: [address], [address], [place] [address], [phone], or [address], [place] [address], [phone]    To File a Complaint with the Division: If you wish to file a complaint with the  of the Division of  Industrial Relations (DIR),  please contact the Workers’ Compensation Section, [address], [address], [place], [place] [address], telephone [phone], or  [address], [address], [place], [place] [address], telephone [phone].    For assistance with Workers’ Compensation Issues: you may contact the Office of the [facility] Consumer Health Assistance, [address] [place] [address], [address], [place], [place] [address], Toll Free 1-513.725.9096, Web site: http://[email or web address].[email or web address]., E-mail  [email or web address].                                                                                                                                                                                                                                   __________________________________________________________________                                                                   _________________                Employee Name / Signature                                                                                                                                                       Date                                                                                                                                                                                                     D-2 (rev. 10/07)

## 2017-11-24 NOTE — PROGRESS NOTES
Chief Complaint   Patient presents with   • Back Pain     WC New, Pt fell causing injury to lower back       HISTORY OF PRESENT ILLNESS: Patient is a 22 y.o. female who presents today becauseShe has lower back pain after fall at work.    Date of injury late evening on 11/22/2017. She tripped over a box at work, twisting and falling backwards. She did not have any trauma or blunt direct force on her lower back, but she has lower back pain. It does not radiate to her legs. She has been using occasional Aleve and ibuprofen. Also alternating heat and ice, which helps a little bit.    Patient Active Problem List    Diagnosis Date Noted   • Memory changes 10/12/2016   • Need for HPV vaccination 08/05/2016   • Bilateral foot pain 05/18/2016   • Moderate cervical dysplasia 02/01/2016   • Encounter for surveillance of contraceptives 02/01/2016   • Encounter for routine gynecological examination 01/12/2015   • Acute low back pain 08/20/2014   • Bilateral chronic knee pain 10/22/2013   • Moderate persistent asthma    • Family history of early CAD 03/07/2011   • History of depression 03/07/2011       Allergies:Cillins [penicillins]; Codeine; and Sulfa drugs    Current Outpatient Prescriptions Ordered in [facility]   Medication Sig Dispense Refill   • norelgestromin-ethinyl estradiol (ORTHO EVRA) 150-35 MCG/24HR patch Apply 1 Patch to skin as directed every 7 days. 12 Patch 4   • norelgestromin-ethinyl estradiol (ORTHO EVRA) 150-35 MCG/24HR patch Apply 1 Patch to skin as directed every 7 days. 12 Patch 4   • azithromycin (ZITHROMAX) 250 MG Tab Z-caren, Use as directed. 6 Tab 0   • benzonatate (TESSALON) 100 MG Cap Take 1 Cap by mouth 3 times a day as needed for Cough. 30 Cap 0   • Pseudoephedrine-APAP-DM (DAYQUIL PO) Take  by mouth.     • Pseudoeph-Doxylamine-DM-APAP (NYQUIL PO) Take  by mouth.     • Bismuth Subsalicylate (PEPTO-BISMOL PO) Take  by mouth.     • ondansetron (ZOFRAN ODT) 4 MG TABLET DISPERSIBLE Take 1 Tab by mouth every 8  hours as needed for Nausea/Vomiting. 10 Tab 0   • mupirocin (BACTROBAN) 2 % Ointment Apply 1 Application to affected area(s) 2 times a day. 1 Tube 0   • albuterol 108 (90 BASE) MCG/ACT Aero Soln inhalation aerosol Inhale 2 Puffs by mouth every 6 hours as needed for Shortness of Breath. 2 Inhaler 1   • fluticasone (FLOVENT HFA) 44 MCG/ACT Aerosol Inhale 2 Puffs by mouth 2 times a day. 3 Inhaler 3   • fluticasone (FLONASE ALLERGY RELIEF) 50 MCG/ACT nasal spray Spray 1 Spray in nose 2 times a day. 16 g 0   • cetirizine (ZYRTEC) 10 MG TABS Take 1 Tab by mouth every day. 30 Tab 3   • montelukast (SINGULAIR) 10 MG TABS Take 1 Tab by mouth every day. 30 Tab 11   • fluticasone (FLONASE) 50 MCG/ACT nasal spray Spray 2 Sprays in nose as needed. Each Nostril (Patient not taking: Reported on 10/12/2016) 1 Bottle 5     No current Epic-ordered facility-administered medications on file.        Past Medical History:   Diagnosis Date   • Acute low back pain 8/20/2014    X 1 week Cramping, suprpubic, L=R Pain in low back, most of the time 10/10 at it's worst 5/10 at it's best Tried ?asa, one time, before work - didn't help No BM changes Periods usu 4-7 days, usu once/month About 2 weeks ago, had one day of bleeding Sexually active, use condoms 100% of the time, one broke around 8/4   • Anxiety    • ASTHMA    • Depression     on OCPs   • Environmental allergies     mold, dust, grass   • Migraine    • Syncopes, vasovagal        Social History   Substance Use Topics   • Smoking status: Never Smoker   • Smokeless tobacco: Never Used   • Alcohol use No       Family Status   Relation Status   • Mother    • Maternal Grandmother    • Maternal Grandfather    • Father    • Paternal Grandfather      Family History   Problem Relation Age of Onset   • Diabetes Mother    • Heart Disease Mother      MI- age 46, 39 yo   • Diabetes Maternal Grandmother    • Heart Disease Maternal Grandmother    • Hypertension Maternal Grandmother    • Stroke Maternal  "Grandmother    • Diabetes Maternal Grandfather    • Heart Disease Maternal Grandfather    • Hypertension Maternal Grandfather    • Stroke Maternal Grandfather    • Hypertension Father    • Hyperlipidemia Father    • Cancer Paternal Grandfather        ROS:  Review of Systems   Constitutional: Negative for fever, chills, weight loss and malaise/fatigue.   HENT: Negative for ear pain, nosebleeds, congestion, sore throat and neck pain.    Eyes: Negative for blurred vision.   Respiratory: Negative for cough, sputum production, shortness of breath and wheezing.    Cardiovascular: Negative for chest pain, palpitations, orthopnea and leg swelling.   Gastrointestinal: Negative for heartburn, nausea, vomiting and abdominal pain.       Exam:  Blood pressure 104/68, pulse 88, temperature 36.6 °C (97.8 °F), resp. rate 14, height 1.676 m (5' 6\"), weight 73.9 kg (163 lb), SpO2 97 %.  General:  Well nourished, well developed female in NAD  Head:Normocephalic, atraumatic  Eyes: PERRLA, EOM within normal limits, no conjunctival injection, no scleral icterus, visual fields and acuity grossly intact.  Extremities: no clubbing, cyanosis, or edema.  Musculoskeletal: Lumbar spine is without any visual deformity, erythema, edema or ecchymosis. She has reduced range of motion in all planes secondary to pain. She has tenderness to palpation of the paraspinous musculature and tissues of her lumbar spine bilaterally, no vertebral point tenderness.    Please note that this dictation was created using voice recognition software. I have made every reasonable attempt to correct obvious errors, but I expect that there are errors of grammar and possibly content that I did not discover before finalizing the note.    Assessment/Plan:  1. Strain of lumbar region, initial encounter     Recommended heat to the area, recommended ibuprofen 200 mg tablets over-the-counter, 3 tablets, 3 times daily. Gentle range of motion, restrictions, follow-up in one " week

## 2017-11-24 NOTE — LETTER
[address] [place] [address]  Phone:  [phone] - Fax:  [phone]   [facility] Progress Report and Disability Certification  Date of Service: 2017   No Show:  No  Date / Time of Next Visit: 2017   Claim Information   Patient Name: Kirsty Landry  Claim Number:     Employer: [employer]  Date of Injury: 2017     Insurer / TPA: [employer]  ID / SSN:     Occupation:   Diagnosis: The encounter diagnosis was Strain of lumbar region, initial encounter.    Medical Information   Related to Industrial Injury? Yes    Subjective Complaints:  Lower back pain after fall at work 2 days ago   Objective Findings: Musculoskeletal: Lumbar spine is without any visual deformity, erythema, edema or ecchymosis. She has reduced range of motion in all planes secondary to pain. She has tenderness to palpation of the paraspinous musculature and tissues of her lumbar spine bilaterally, no vertebral point tenderness.     Pre-Existing Condition(s):     Assessment:   Initial Visit    Status: Additional Care Required  Comments:follow-up in one week  Permanent Disability:No    Plan: Medication  Comments:over-the-counter anti-inflammatories    Diagnostics:      Comments:       Disability Information   Status: Released to Restricted Duty    From:  2017  Through: 2017 Restrictions are:     Physical Restrictions   Sitting:    Standing:    Stoopin hrs/day Bendin hrs/day   Squattin hrs/day Walking:    Climbin hrs/day Pushing:    Comments:10 pounds   Pulling:    Comments:10 pounds Other:    Reaching Above Shoulder (L):   Reaching Above Shoulder (R):       Reaching Below Shoulder (L):    Reaching Below Shoulder (R):      Not to exceed Weight Limits   Carrying(hrs):   Weight Limit(lb): < or = to 10 pounds Lifting(hrs):   Weight  Limit(lb): < or = to 10 pounds   Comments:      Repetitive Actions   Hands:  i.e. Fine Manipulations from Grasping:     Feet: i.e. Operating Foot Controls:     Driving / Operate Machinery:     Physician Name: Scar Ospina P.A.-C. Physician Signature: SCAR Allen P.A.-C. e-Signature: Dr. Jasmeet Presley, Medical Director   Clinic Name / Location: [address] [address] Clinic Phone Number: Dept: [phone]   Appointment Time: 1:55 Pm Visit Start Time: 2:07 PM   Check-In Time:  2:01 Pm Visit Discharge Time: 2:23 PM   Original-Treating Physician or Chiropractor    Page 2-Insurer/TPA    Page 3-Employer    Page 4-Employee

## 2017-12-01 ENCOUNTER — OCCUPATIONAL MEDICINE (OUTPATIENT)
Dept: URGENT CARE | Facility: PHYSICIAN GROUP | Age: 22
End: 2017-12-01
Payer: COMMERCIAL

## 2017-12-01 VITALS
HEIGHT: 66 IN | DIASTOLIC BLOOD PRESSURE: 80 MMHG | OXYGEN SATURATION: 98 % | BODY MASS INDEX: 26.03 KG/M2 | TEMPERATURE: 98.1 F | RESPIRATION RATE: 16 BRPM | WEIGHT: 162 LBS | SYSTOLIC BLOOD PRESSURE: 110 MMHG | HEART RATE: 90 BPM

## 2017-12-01 DIAGNOSIS — M54.6 ACUTE BILATERAL THORACIC BACK PAIN: ICD-10-CM

## 2017-12-01 DIAGNOSIS — S39.012D STRAIN OF LUMBAR SPINE, SUBSEQUENT ENCOUNTER: ICD-10-CM

## 2017-12-01 DIAGNOSIS — M54.2 CERVICAL MUSCLE PAIN: ICD-10-CM

## 2017-12-01 PROCEDURE — 99213 OFFICE O/P EST LOW 20 MIN: CPT | Mod: 29 | Performed by: PHYSICIAN ASSISTANT

## 2017-12-01 NOTE — LETTER
"   [facility] [place] [address]  Phone:  [phone] - Fax:  [phone]   [facility] Progress Report and Disability Certification  Date of Service: 12/1/2017   No Show:  No  Date / Time of Next Visit: 12/6/2017   Claim Information   Patient Name: Kirsty Landry  Claim Number:     Employer: [employer]  Date of Injury: 11/22/2017     Insurer / TPA: [employer]  ID / SSN:     Occupation:   Diagnosis: Diagnoses of Strain of lumbar spine, subsequent encounter, Acute bilateral thoracic back pain, and Cervical muscle pain were pertinent to this visit.    Medical Information   Related to Industrial Injury? Yes    Subjective Complaints:  Date of injury late evening on 11/22/2017. Patient comes in for follow-up for low back pain that started while she was at work. She tripped over a box at work, twisting and falling backwards. She did not have any trauma or blunt direct force on her lower back, but she has lower back pain. Patient states overall she has had no improvement in her pain from her last visit here. Patient has been taking ibuprofen 300 mg 3 times a day without any relief of her symptoms. She does have temporary relief with heat and Aspercreme. She states the pain has moved up her back encompassing most of the thoracic and cervical region. She states, \"even my love handles hurt.\" Patient denies saddle anesthesia, bowel/bladder incontinence, and lower extremity weakness. She has worsening pain with standing for long periods of time and rotating her trunk.    Objective Findings: Blood pressure 110/80, pulse 90, temperature 36.7 °C (98.1 °F), resp. rate 16, height 1.676 m (5' 6\"), weight 73.5 kg (162 lb), SpO2 98 %.  General: Well developed, well nourished. No distress.  HEENT:Head is also normal.  Pulmonary: No respiratory distress noted.  Back: No localized tenderness noted. Decreased range of motion in all planes " secondary to pain. Antalgic gait noted.  Extremities: No motor or sensory deficit noted. Prepatellar and Achilles DTRs are strong and equal bilaterally.  Skin: Warm, dry, good turgor. No rashes in visible areas.   Psych: Normal mood. Alert and oriented x3. Judgment and insight is normal.   Pre-Existing Condition(s):     Assessment:   Condition Same    Status: Additional Care Required  Permanent Disability:No    Plan: Medication  Comments:OTC meds only    Diagnostics:      Comments:  Assessment/Plan:  Alternate ibuprofen 800 mg with acetaminophen 1000 mg every 3-4 hours as needed for pain, not exceeding dosages as recommended on the bottles. Recommend using heat/ice, over-the-counter muscle rubs/patches, and possibly a TENS unit   for additional pain relief. Follow-up in 3-5 days for reevaluation, sooner for any changes in symptoms. See D39 for restrictions.  1. Strain of lumbar spine, subsequent encounter    2. Acute bilateral thoracic back pain    3. Cervical muscle pain      Disability Information   Status: Released to Restricted Duty    From:  2017  Through: 2017 Restrictions are: Temporary   Physical Restrictions   Sitting:    Standing:  < or = to 6 hrs/day Stoopin hrs/day Bendin hrs/day   Squattin hrs/day Walking:  < or = to 6 hrs/day Climbin hrs/day  Comments:no ladders, may use stairs Pushing:    Comments:10 pounds   Pulling:    Comments:10 pounds Other:    Reaching Above Shoulder (L):   Reaching Above Shoulder (R):       Reaching Below Shoulder (L):    Reaching Below Shoulder (R):      Not to exceed Weight Limits   Carrying(hrs):   Weight Limit(lb): < or = to 10 pounds Lifting(hrs):   Weight  Limit(lb): < or = to 10 pounds   Comments:      Repetitive Actions   Hands: i.e. Fine Manipulations from Grasping:     Feet: i.e. Operating Foot Controls:     Driving / Operate Machinery:     Physician Name: Chester Gamino P.A.-C. Physician Signature:   CHESTER GAMINO P.A.-C.  e-Signature: Dr. Jasmeet Presley, Medical Director   Clinic Name / Location: [address] [address] Clinic Phone Number: Dept: [phone]   Appointment Time: 1:00 Pm Visit Start Time: 12:49 PM   Check-In Time:  12:45 Pm Visit Discharge Time:  4:07PM   Original-Treating Physician or Chiropractor    Page 2-Insurer/TPA    Page 3-Employer    Page 4-Employee

## 2017-12-01 NOTE — PROGRESS NOTES
"Chief Complaint   Patient presents with   • Follow-Up     [facility], Back injury, Pt still feeling pain, minimal improvement       HISTORY OF PRESENT ILLNESS: Patient is a 22 y.o. female who presents today for the following:    Date of injury late evening on 11/22/2017. Patient comes in for follow-up for low back pain that started while she was at work. She tripped over a box at work, twisting and falling backwards. She did not have any trauma or blunt direct force on her lower back, but she has lower back pain. Patient states overall she has had no improvement in her pain from her last visit here. Patient has been taking ibuprofen 300 mg 3 times a day without any relief of her symptoms. She does have temporary relief with heat and Aspercreme. She states the pain has moved up her back encompassing most of the thoracic and cervical region. She states, \"even my love handles hurt.\" Patient denies saddle anesthesia, bowel/bladder incontinence, and lower extremity weakness. She has worsening pain with standing for long periods of time and rotating her trunk.     Patient Active Problem List    Diagnosis Date Noted   • Memory changes 10/12/2016   • Need for HPV vaccination 08/05/2016   • Bilateral foot pain 05/18/2016   • Moderate cervical dysplasia 02/01/2016   • Encounter for surveillance of contraceptives 02/01/2016   • Encounter for routine gynecological examination 01/12/2015   • Acute low back pain 08/20/2014   • Bilateral chronic knee pain 10/22/2013   • Moderate persistent asthma    • Family history of early CAD 03/07/2011   • History of depression 03/07/2011       Allergies:Cillins [penicillins]; Codeine; and Sulfa drugs    Current Outpatient Prescriptions Ordered in [facility]   Medication Sig Dispense Refill   • norelgestromin-ethinyl estradiol (ORTHO EVRA) 150-35 MCG/24HR patch Apply 1 Patch to skin as directed every 7 days. 12 Patch 4   • norelgestromin-ethinyl estradiol (ORTHO EVRA) 150-35 MCG/24HR patch Apply 1 Patch " "to skin as directed every 7 days. 12 Patch 4     No current Epic-ordered facility-administered medications on file.        Past Medical History:   Diagnosis Date   • Acute low back pain 8/20/2014    X 1 week Cramping, suprpubic, L=R Pain in low back, most of the time 10/10 at it's worst 5/10 at it's best Tried ?asa, one time, before work - didn't help No BM changes Periods usu 4-7 days, usu once/month About 2 weeks ago, had one day of bleeding Sexually active, use condoms 100% of the time, one broke around 8/4   • Anxiety    • ASTHMA    • Depression     on OCPs   • Environmental allergies     mold, dust, grass   • Migraine    • Syncopes, vasovagal        Social History   Substance Use Topics   • Smoking status: Never Smoker   • Smokeless tobacco: Never Used   • Alcohol use No       Family Status   Relation Status   • Mother    • Maternal Grandmother    • Maternal Grandfather    • Father    • Paternal Grandfather      Family History   Problem Relation Age of Onset   • Diabetes Mother    • Heart Disease Mother      MI- age 46, 37 yo   • Diabetes Maternal Grandmother    • Heart Disease Maternal Grandmother    • Hypertension Maternal Grandmother    • Stroke Maternal Grandmother    • Diabetes Maternal Grandfather    • Heart Disease Maternal Grandfather    • Hypertension Maternal Grandfather    • Stroke Maternal Grandfather    • Hypertension Father    • Hyperlipidemia Father    • Cancer Paternal Grandfather        ROS:    Review of Systems   Constitutional: Negative for fever, chills, weight loss and malaise/fatigue.   Gastrointestinal: Negative for heartburn, nausea, vomiting and abdominal pain.   Genitourinary: Negative for dysuria, urgency and frequency.       Exam:  Blood pressure 110/80, pulse 90, temperature 36.7 °C (98.1 °F), resp. rate 16, height 1.676 m (5' 6\"), weight 73.5 kg (162 lb), SpO2 98 %.  General: Well developed, well nourished. No distress.  HEENT:Head is also normal.  Pulmonary: No respiratory " distress noted.  Back: No localized tenderness noted. Decreased range of motion in all planes secondary to pain. Antalgic gait noted.  Extremities: No motor or sensory deficit noted. Prepatellar and Achilles DTRs are strong and equal bilaterally.  Skin: Warm, dry, good turgor. No rashes in visible areas.   Psych: Normal mood. Alert and oriented x3. Judgment and insight is normal.    Assessment/Plan:  Alternate ibuprofen 800 mg with acetaminophen 1000 mg every 3-4 hours as needed for pain, not exceeding dosages as recommended on the bottles. Recommend using heat/ice, over-the-counter muscle rubs/patches, and possibly a TENS unit for additional pain relief. Follow-up in 3-5 days for reevaluation, sooner for any changes in symptoms. See D39 for restrictions.  1. Strain of lumbar spine, subsequent encounter     2. Acute bilateral thoracic back pain     3. Cervical muscle pain

## 2017-12-01 NOTE — LETTER
"   [facility] [place] [place] [address]  Phone:  [phone] - Fax:  [phone]   [facility] Progress Report and Disability Certification  Date of Service: 12/1/2017   No Show:  No  Date / Time of Next Visit: 12/6/2017   Claim Information   Patient Name: Kirsty Landry  Claim Number:     Employer: [employer]  Date of Injury: 11/22/2017     Insurer / TPA: [employer]  ID / SSN:     Occupation:   Diagnosis: Diagnoses of Strain of lumbar spine, subsequent encounter, Acute bilateral thoracic back pain, and Cervical muscle pain were pertinent to this visit.    Medical Information   Related to Industrial Injury? Yes    Subjective Complaints:  Date of injury late evening on 11/22/2017. Patient comes in for follow-up for low back pain that started while she was at work. She tripped over a box at work, twisting and falling backwards. She did not have any trauma or blunt direct force on her lower back, but she has lower back pain. Patient states overall she has had no improvement in her pain from her last visit here. Patient has been taking ibuprofen 300 mg 3 times a day without any relief of her symptoms. She does have temporary relief with heat and Aspercreme. She states the pain has moved up her back encompassing most of the thoracic and cervical region. She states, \"even my love handles hurt.\" Patient denies saddle anesthesia, bowel/bladder incontinence, and lower extremity weakness. She has worsening pain with standing for long periods of time and rotating her trunk.    Objective Findings: Blood pressure 110/80, pulse 90, temperature 36.7 °C (98.1 °F), resp. rate 16, height 1.676 m (5' 6\"), weight 73.5 kg (162 lb), SpO2 98 %.  General: Well developed, well nourished. No distress.  HEENT:Head is also normal.  Pulmonary: No respiratory distress noted.  Back: No localized tenderness noted. Decreased range of motion in all planes " secondary to pain. Antalgic gait noted.  Extremities: No motor or sensory deficit noted. Prepatellar and Achilles DTRs are strong and equal bilaterally.  Skin: Warm, dry, good turgor. No rashes in visible areas.   Psych: Normal mood. Alert and oriented x3. Judgment and insight is normal.   Pre-Existing Condition(s):     Assessment:   Condition Same    Status: Additional Care Required  Permanent Disability:No    Plan: Medication  Comments:OTC meds only    Diagnostics:      Comments:  Assessment/Plan:  Alternate ibuprofen 800 mg with acetaminophen 1000 mg every 3-4 hours as needed for pain, not exceeding dosages as recommended on the bottles. Recommend using heat/ice, over-the-counter muscle rubs/patches, and possibly a TENS unit   for additional pain relief. Follow-up in 3-5 days for reevaluation, sooner for any changes in symptoms. See D39 for restrictions.  1. Strain of lumbar spine, subsequent encounter    2. Acute bilateral thoracic back pain    3. Cervical muscle pain      Disability Information   Status: Released to Restricted Duty    From:  2017  Through: 2017 Restrictions are: Temporary   Physical Restrictions   Sitting:    Standing:  < or = to 6 hrs/day Stoopin hrs/day Bendin hrs/day   Squattin hrs/day Walking:  < or = to 6 hrs/day Climbin hrs/day Pushin hrs/day  Comments:10 pounds   Pullin hrs/day  Comments:10 pounds Other:    Reaching Above Shoulder (L):   Reaching Above Shoulder (R):       Reaching Below Shoulder (L):    Reaching Below Shoulder (R):      Not to exceed Weight Limits   Carrying(hrs):   Weight Limit(lb): < or = to 10 pounds Lifting(hrs):   Weight  Limit(lb): < or = to 10 pounds   Comments:      Repetitive Actions   Hands: i.e. Fine Manipulations from Grasping:     Feet: i.e. Operating Foot Controls:     Driving / Operate Machinery:     Physician Name: Chester Gamino P.A.-C. Physician Signature: CHESTER Dukes P.A.-C. e-Signature: Dr. Alamo  Fernandez, Medical Director   Clinic Name / Location: [facility] [place]  [address] [place] [address]  [place] [address] Clinic Phone Number: Dept: [phone]   Appointment Time: 1:00 Pm Visit Start Time: 12:49 PM   Check-In Time:  12:45 Pm Visit Discharge Time:  1:36 pm   Original-Treating Physician or Chiropractor    Page 2-Insurer/TPA    Page 3-Employer    Page 4-Employee

## 2017-12-06 ENCOUNTER — OCCUPATIONAL MEDICINE (OUTPATIENT)
Dept: URGENT CARE | Facility: PHYSICIAN GROUP | Age: 22
End: 2017-12-06
Payer: COMMERCIAL

## 2017-12-06 VITALS
SYSTOLIC BLOOD PRESSURE: 108 MMHG | WEIGHT: 162 LBS | OXYGEN SATURATION: 99 % | RESPIRATION RATE: 16 BRPM | DIASTOLIC BLOOD PRESSURE: 72 MMHG | BODY MASS INDEX: 26.03 KG/M2 | HEART RATE: 70 BPM | HEIGHT: 66 IN | TEMPERATURE: 98.9 F

## 2017-12-06 DIAGNOSIS — S29.019D STRAIN OF THORACIC REGION, SUBSEQUENT ENCOUNTER: ICD-10-CM

## 2017-12-06 DIAGNOSIS — S39.012D LUMBAR STRAIN, SUBSEQUENT ENCOUNTER: ICD-10-CM

## 2017-12-06 PROCEDURE — 99213 OFFICE O/P EST LOW 20 MIN: CPT | Mod: 29 | Performed by: NURSE PRACTITIONER

## 2017-12-06 RX ORDER — CYCLOBENZAPRINE HCL 10 MG
10 TABLET ORAL 3 TIMES DAILY PRN
Qty: 20 TAB | Refills: 0 | Status: SHIPPED | OUTPATIENT
Start: 2017-12-06 | End: 2017-12-11 | Stop reason: SDUPTHER

## 2017-12-06 ASSESSMENT — ENCOUNTER SYMPTOMS
MYALGIAS: 0
TINGLING: 0
NAUSEA: 0
BACK PAIN: 1
ABDOMINAL PAIN: 0
VOMITING: 0
FEVER: 0
NECK PAIN: 0
SENSORY CHANGE: 0
FOCAL WEAKNESS: 0
CHILLS: 0

## 2017-12-06 NOTE — LETTER
[facility]  [address] [place] [address]. [address] - [place] [address]  Phone:  [phone] - Fax:  [phone]   [facility] Progress Report and Disability Certification  Date of Service: 12/6/2017   No Show:  No  Date / Time of Next Visit: 12/11/2017 @4:00PM   Claim Information   Patient Name: Kirsty Landry  Claim Number:     Employer: [employer]  Date of Injury: 11/22/2017     Insurer / TPA: [employer]  ID / SSN:     Occupation:   Diagnosis: Diagnoses of Lumbar strain, subsequent encounter and Strain of thoracic region, subsequent encounter were pertinent to this visit.    Medical Information   Related to Industrial Injury? Yes    Subjective Complaints:  Date of injury is 11/22/17. This is patient's third visit for this injury. She was at work and tripped falling forwards landing on the large box. Patient complains of upper back pain and lower back pain. Patient states there is been no improvement since her initial injury. She has been taking ibuprofen,Tylenol, ice and heat, Aspercreme for pain without any relief. She states the pain is a 3 out of 10. But she has increased pain with movement. She complains of pain in between her shoulder blades. Her lower back radiating bilaterally. She denies any pain radiation to her legs. She denies any changes in bowel or bladder control. She denies any numbness or tingling in her extremities.   Objective Findings: Patient's awake alert no acute distress. Vital signs are within normal limits. She has tenderness to her upper back, mainly on the midline and right paraspinal area. She also has tenderness in her lower lumbar area radiating bilaterally. She has good flexion-extension of her extremities. Patellar reflexes are +2 bilaterally. Skin is warm and dry. She ambulates without difficulty.   Pre-Existing Condition(s): Patient denies   Assessment:   Condition Same    Status: Additional Care Required  Permanent  Disability:No    Plan: Medication (NOT at Work)    Diagnostics:      Comments:  Continue ibuprofen no more than 800 every 8 hours. Continue ice and heat. Take Flexeril when not at work, driving or operating machinery. Follow-up with occupational medicine at the next available appointment.    Disability Information   Status: Released to Restricted Duty    From:  2017  Through: 2017 Restrictions are: Temporary  Comments:follow-up with occupational medicine at the next available appointment   Physical Restrictions   Sitting:    Standing:  < or = to 6 hrs/day Stoopin hrs/day Bendin hrs/day   Squattin hrs/day Walking:  < or = to 6 hrs/day Climbing:    Comments:she may climb stairs. No climbing of ladders Pushing:    Comments:less than 10 pounds   Pulling:    Comments:less than 10 pounds Other:    Reaching Above Shoulder (L):   Reaching Above Shoulder (R):       Reaching Below Shoulder (L):    Reaching Below Shoulder (R):      Not to exceed Weight Limits   Carrying(hrs):   Weight Limit(lb): < or = to 10 pounds Lifting(hrs):   Weight  Limit(lb): < or = to 10 pounds   Comments:      Repetitive Actions   Hands: i.e. Fine Manipulations from Grasping:     Feet: i.e. Operating Foot Controls:     Driving / Operate Machinery:     Physician Name: BRONWYN Acosta Physician Signature: INGRID Mims e-Signature: Dr. Jasmeet Presley, Medical Director   Clinic Name / Location: [address]  [place] [address] Clinic Phone Number: Dept: [phone]   Appointment Time: 5:30 Pm Visit Start Time: 5:24 PM   Check-In Time:  5:10 Pm Visit Discharge Time: 6:01 PM   Original-Treating Physician or Chiropractor    Page 2-Insurer/TPA    Page 3-Employer    Page 4-Employee

## 2017-12-07 NOTE — PROGRESS NOTES
"Subjective:      Kirsty Landry is a 22 y.o. female who presents with Follow-Up ( [facility] DOI 11/22/17 Lumbar spine strain - No improvement in pain )            Medications, Allergies and Prior Medical Hx reviewed and updated in [facility].with patient/family today       Date of injury is 11/22/17. This is patient's third visit for this injury. She was at work and tripped falling forwards landing on the large box. Patient complains of upper back pain and lower back pain. Patient states there is been no improvement since her initial injury. She has been taking ibuprofen,Tylenol, ice and heat, Aspercreme for pain without any relief. She states the pain is a 3 out of 10. But she has increased pain with movement. She complains of pain in between her shoulder blades. Her lower back radiating bilaterally. She denies any pain radiation to her legs. She denies any changes in bowel or bladder control. She denies any numbness or tingling in her extremities.        Review of Systems   Constitutional: Negative for chills, fever and malaise/fatigue.   Gastrointestinal: Negative for abdominal pain, nausea and vomiting.   Musculoskeletal: Positive for back pain. Negative for joint pain, myalgias and neck pain.   Skin: Negative for rash.   Neurological: Negative for tingling, sensory change and focal weakness.          Objective:     /72   Pulse 70   Temp 37.2 °C (98.9 °F)   Resp 16   Ht 1.676 m (5' 6\")   Wt 73.5 kg (162 lb)   SpO2 99%   BMI 26.15 kg/m²      Physical Exam   Constitutional: She appears well-developed and well-nourished. No distress.   HENT:   Head: Normocephalic and atraumatic.   Eyes: Conjunctivae are normal. Pupils are equal, round, and reactive to light.   Neck: Neck supple.   Cardiovascular: Normal rate.    Pulmonary/Chest: Effort normal. No respiratory distress.   Neurological: She is alert.   Awake, alert, answering questions appropriately, moving all extremeties   Skin: Skin is warm and dry. Capillary " refill takes less than 2 seconds. No erythema.   Psychiatric: She has a normal mood and affect. Her behavior is normal.   Vitals reviewed.       Patient's awake alert no acute distress. Vital signs are within normal limits. She has tenderness to her upper back, mainly on the midline and right paraspinal area. She also has tenderness in her lower lumbar area radiating bilaterally. She has good flexion-extension of her extremities. Patellar reflexes are +2 bilaterally. Skin is warm and dry. She ambulates without difficulty.       Assessment/Plan:     1. Lumbar strain, subsequent encounter  cyclobenzaprine (FLEXERIL) 10 MG Tab   2. Strain of thoracic region, subsequent encounter  cyclobenzaprine (FLEXERIL) 10 MG Tab       Do not drink alcohol or operate machinery with this medication      Work restrictions as documented on D 39 form  Pt will go to the ER for worsening or changing symptoms as discussed, follow-up at a [facility] occupational medicine as scheduled  Discharge instructions discussed with pt/family who verbalize understanding and agreement with poc

## 2017-12-11 ENCOUNTER — OCCUPATIONAL MEDICINE (OUTPATIENT)
Dept: OCCUPATIONAL MEDICINE | Facility: CLINIC | Age: 22
End: 2017-12-11
Payer: COMMERCIAL

## 2017-12-11 VITALS
RESPIRATION RATE: 16 BRPM | TEMPERATURE: 98.4 F | OXYGEN SATURATION: 98 % | DIASTOLIC BLOOD PRESSURE: 68 MMHG | WEIGHT: 163 LBS | SYSTOLIC BLOOD PRESSURE: 104 MMHG | HEIGHT: 66 IN | BODY MASS INDEX: 26.2 KG/M2 | HEART RATE: 84 BPM

## 2017-12-11 DIAGNOSIS — S39.012A STRAIN OF LUMBAR REGION, INITIAL ENCOUNTER: ICD-10-CM

## 2017-12-11 DIAGNOSIS — S29.019D STRAIN OF THORACIC REGION, SUBSEQUENT ENCOUNTER: ICD-10-CM

## 2017-12-11 DIAGNOSIS — S39.012D LUMBAR STRAIN, SUBSEQUENT ENCOUNTER: ICD-10-CM

## 2017-12-11 PROCEDURE — 99204 OFFICE O/P NEW MOD 45 MIN: CPT | Performed by: PREVENTIVE MEDICINE

## 2017-12-11 RX ORDER — CYCLOBENZAPRINE HCL 10 MG
10 TABLET ORAL 3 TIMES DAILY PRN
Qty: 20 TAB | Refills: 0 | Status: SHIPPED | OUTPATIENT
Start: 2017-12-11 | End: 2018-01-03 | Stop reason: SDUPTHER

## 2017-12-11 RX ORDER — DICLOFENAC SODIUM 75 MG/1
75 TABLET, DELAYED RELEASE ORAL 2 TIMES DAILY
Qty: 60 TAB | Refills: 0 | Status: SHIPPED | OUTPATIENT
Start: 2017-12-11 | End: 2019-03-05

## 2017-12-11 NOTE — LETTER
[address],   [address] [place] [address]  Phone:  [phone] - Fax:  [phone]   Occupational Health [facility] Progress Report and Disability Certification  Date of Service: 12/11/2017   No Show:  No  Date / Time of Next Visit: 1/3/18@4:30 PM   Claim Information   Patient Name: Kirsty Landry  Claim Number:     Employer: [employer]  Date of Injury: 11/22/2017     Insurer / TPA: [employer]  ID / SSN:     Occupation:   Diagnosis: Diagnoses of Strain of lumbar region, initial encounter, Lumbar strain, subsequent encounter, and Strain of thoracic region, subsequent encounter were pertinent to this visit.    Medical Information   Related to Industrial Injury? Yes    Subjective Complaints:  DOI 11/22/17: 22 year old female here regarding lower back injury. She was at work and tripped falling forwards landing on the large box, noted immediate mid and lower back pain. Seen in UCx3, given flexeril and advised NSAIDs. Patient states that overall her back pain is feeling about the same. Pain extends from her mid back to her upper back and to her lower back. She states that her neckfeeling sore as well. She states the Flexeril does seem to help somewhat. She notes worsening pain of any bending, twisting or movement of the back. Patient denies significant previous back injury. Denies any radiating pain, numbness or tingling. She has not been working the last few days due to restrictions.   Objective Findings: Lumbar/Thoracic: No gross deformity. Diffuse tenderness palpation bilateral paraspinal musculature throughout the entire thoracic and lumbar spine. Very limited range of motion to less than 10° in each direction. Straight leg test negative. Reflexes intact. Strength intact.  Cervical: No gross deformity. Diffuse tenderness to palpation bilateral paraspinal musculature. Slightly limited rotation bilaterally.   Pre-Existing Condition(s):        Assessment:   Condition Same    Status: Additional Care Required  Permanent Disability:No    Plan:      Diagnostics:      Comments:  Referral to physical therapy  Prescribed diclofenac, continue flexeril as needed  Continue restricted duty  Follow up 3 weeks    Disability Information   Status: Released to Restricted Duty    From:  12/11/2017  Through: 1/2/2018 Restrictions are:     Physical Restrictions   Sitting:    Standing:    Stooping:  < or = to 1 hr/day Bending:  < or = to 1 hr/day   Squatting:  < or = to 1 hr/day Walking:    Climbing:    Pushing:      Pulling:    Other:    Reaching Above Shoulder (L):   Reaching Above Shoulder (R):       Reaching Below Shoulder (L):    Reaching Below Shoulder (R):      Not to exceed Weight Limits   Carrying(hrs):   Weight Limit(lb): < or = to 10 pounds Lifting(hrs):   Weight  Limit(lb): < or = to 10 pounds   Comments: Limit push/pull/lift to less than 10 lbs. Allow to sit/stand/walk as needed for comfort.    Repetitive Actions   Hands: i.e. Fine Manipulations from Grasping:     Feet: i.e. Operating Foot Controls:     Driving / Operate Machinery:     Physician Name: Andres Maya D.O. Physician Signature: bonySignANDRES MAYA D.O. e-Signature: Dr. Jasmeet Presley, Medical Director   Clinic Name / Location: [address],   [address] [place] [address] Clinic Phone Number: Dept: [phone]   Appointment Time: 4:00 Pm Visit Start Time: 3:21 PM   Check-In Time:  3:20 Pm Visit Discharge Time:  3:57 PM   Original-Treating Physician or Chiropractor    Page 2-Insurer/TPA    Page 3-Employer    Page 4-Employee

## 2017-12-11 NOTE — PROGRESS NOTES
"Subjective:      Kirsty Landry is a 22 y.o. female who presents with Follow-Up (WC DOI 11/22/17 back injury feeling the same room 2)      DOI 11/22/17: 22 year old female here regarding lower back injury. She was at work and tripped falling forwards landing on the large box, noted immediate mid and lower back pain. Seen in UCx3, given flexeril and advised NSAIDs. Patient states that overall her back pain is feeling about the same. Pain extends from her mid back to her upper back and to her lower back. She states that her neckfeeling sore as well. She states the Flexeril does seem to help somewhat. She notes worsening pain of any bending, twisting or movement of the back. Patient denies significant previous back injury. Denies any radiating pain, numbness or tingling. She has not been working the last few days due to restrictions.     HPI    ROS  ROS: All systems were reviewed on intake form, form was reviewed and signed. See scanned documents in media. Pertinent positives and negatives included in HPI.    PMH: No pertinent past medical history to this problem  MEDS: Medications were reviewed in Epic  ALLERGIES:   Allergies   Allergen Reactions   • Cillins [Penicillins]    • Codeine    • Sulfa Drugs      SOCHX: Works as a  at [employer]   FH: No pertinent family history to this problem     Objective:     /68   Pulse 84   Temp 36.9 °C (98.4 °F)   Resp 16   Ht 1.676 m (5' 6\")   Wt 73.9 kg (163 lb)   SpO2 98%   BMI 26.31 kg/m²      Physical Exam   Constitutional: She is oriented to person, place, and time. She appears well-developed and well-nourished.   HENT:   Right Ear: External ear normal.   Left Ear: External ear normal.   Eyes: Conjunctivae and EOM are normal.   Cardiovascular: Normal rate.    Pulmonary/Chest: Effort normal.   Neurological: She is alert and oriented to person, place, and time.   Skin: Skin is warm and dry.   Psychiatric: She has a normal mood and affect. Judgment normal. "       Lumbar/Thoracic: No gross deformity. Diffuse tenderness palpation bilateral paraspinal musculature throughout the entire thoracic and lumbar spine. Very limited range of motion to less than 10° in each direction. Straight leg test negative. Reflexes intact. Strength intact.  Cervical: No gross deformity. Diffuse tenderness to palpation bilateral paraspinal musculature. Slightly limited rotation bilaterally.       Assessment/Plan:     1. Strain of lumbar region, initial encounter  - diclofenac EC (VOLTAREN) 75 MG Tablet Delayed Response; Take 1 Tab by mouth 2 times a day.  Dispense: 60 Tab; Refill: 0  - REFERRAL TO PHYSICAL THERAPY Reason for Therapy: Eval/Treat/Report    2. Lumbar strain, subsequent encounter  - cyclobenzaprine (FLEXERIL) 10 MG Tab; Take 1 Tab by mouth 3 times a day as needed for Moderate Pain or Muscle Spasms.  Dispense: 20 Tab; Refill: 0  - diclofenac EC (VOLTAREN) 75 MG Tablet Delayed Response; Take 1 Tab by mouth 2 times a day.  Dispense: 60 Tab; Refill: 0  - REFERRAL TO PHYSICAL THERAPY Reason for Therapy: Eval/Treat/Report    3. Strain of thoracic region, subsequent encounter  - cyclobenzaprine (FLEXERIL) 10 MG Tab; Take 1 Tab by mouth 3 times a day as needed for Moderate Pain or Muscle Spasms.  Dispense: 20 Tab; Refill: 0  - diclofenac EC (VOLTAREN) 75 MG Tablet Delayed Response; Take 1 Tab by mouth 2 times a day.  Dispense: 60 Tab; Refill: 0  - REFERRAL TO PHYSICAL THERAPY Reason for Therapy: Eval/Treat/Report    Referral to physical therapy  Prescribed diclofenac, continue flexeril as needed  Continue restricted duty  Follow up 3 weeks  Symptoms greater than what expected given mechanism of injury

## 2018-01-03 ENCOUNTER — OCCUPATIONAL MEDICINE (OUTPATIENT)
Dept: OCCUPATIONAL MEDICINE | Facility: CLINIC | Age: 23
End: 2018-01-03
Payer: COMMERCIAL

## 2018-01-03 VITALS
HEIGHT: 66 IN | DIASTOLIC BLOOD PRESSURE: 72 MMHG | WEIGHT: 163 LBS | SYSTOLIC BLOOD PRESSURE: 102 MMHG | HEART RATE: 75 BPM | RESPIRATION RATE: 16 BRPM | TEMPERATURE: 97.7 F | BODY MASS INDEX: 26.2 KG/M2 | OXYGEN SATURATION: 96 %

## 2018-01-03 DIAGNOSIS — S29.019D STRAIN OF THORACIC REGION, SUBSEQUENT ENCOUNTER: ICD-10-CM

## 2018-01-03 DIAGNOSIS — S39.012D LUMBAR STRAIN, SUBSEQUENT ENCOUNTER: ICD-10-CM

## 2018-01-03 PROCEDURE — 99213 OFFICE O/P EST LOW 20 MIN: CPT | Performed by: PREVENTIVE MEDICINE

## 2018-01-03 RX ORDER — CYCLOBENZAPRINE HCL 10 MG
10 TABLET ORAL 3 TIMES DAILY PRN
Qty: 20 TAB | Refills: 0 | Status: SHIPPED | OUTPATIENT
Start: 2018-01-03 | End: 2019-03-05

## 2018-01-03 RX ORDER — PREDNISONE 20 MG/1
40 TABLET ORAL DAILY
Qty: 14 TAB | Refills: 0 | Status: SHIPPED | OUTPATIENT
Start: 2018-01-03 | End: 2018-01-10

## 2018-01-03 ASSESSMENT — ENCOUNTER SYMPTOMS
SENSORY CHANGE: 0
FOCAL WEAKNESS: 0
TINGLING: 0

## 2018-01-03 ASSESSMENT — PAIN SCALES - GENERAL: PAINLEVEL: 3=SLIGHT PAIN

## 2018-01-03 NOTE — LETTER
[address],   [address] [place] [address]  Phone:  [phone] - Fax:  [phone]   [facility] Progress Report and Disability Certification  Date of Service: 1/3/2018   No Show:  No  Date / Time of Next Visit: 1/23/2018   Claim Information   Patient Name: Kirsty Landry  Claim Number:     Employer: [employer]  Date of Injury: 11/22/2017     Insurer / TPA: [employer]  ID / SSN:     Occupation:   Diagnosis: Diagnoses of Lumbar strain, subsequent encounter and Strain of thoracic region, subsequent encounter were pertinent to this visit.    Medical Information   Related to Industrial Injury? Yes    Subjective Complaints:  DOI 11/22/17: 22 year old female here regarding lower back injury. She was at work and tripped falling forwards landing on the large box, noted immediate mid and lower back pain. Patient states that overall her pain is about the same. Pain is mostly in her lower back at this point. Denies any radiating pain, numbness or tingling. Pain worse with certain movements, flexion and rotation. Patient taking diclofenac and Flexeril with some relief. Physical therapy has yet to be approved.   Objective Findings: Lumbar/Thoracic: No gross deformity. Diffuse tenderness palpation bilateral paraspinal musculature throughout the entire thoracic and lumbar spine. Limited flexion. Straight leg test negative. Reflexes intact. Strength intact.     Pre-Existing Condition(s):     Assessment:   Condition Same    Status: Additional Care Required  Permanent Disability:No    Plan:      Diagnostics:      Comments:  Prescribed prednisone 40 mg ×7 days. Do not take diclofenac on this medication, but may resume after completing prescription  Refill Flexeril  Begin physical therapy when approved  Continue restricted duty  Follow-up 3 weeks    Disability Information   Status: Released to Restricted Duty    From:  1/3/2018  Through:  1/23/2018 Restrictions are: Temporary   Physical Restrictions   Sitting:    Standing:    Stooping:  < or = to 1 hr/day Bending:  < or = to 1 hr/day   Squatting:  < or = to 1 hr/day Walking:    Climbing:    Pushing:      Pulling:    Other:    Reaching Above Shoulder (L):   Reaching Above Shoulder (R):       Reaching Below Shoulder (L):    Reaching Below Shoulder (R):      Not to exceed Weight Limits   Carrying(hrs):   Weight Limit(lb):   Lifting(hrs):   Weight  Limit(lb):     Comments: Limit push/pull/lift to less than 10 lbs. Allow to sit/stand/walk as needed for comfort.     Repetitive Actions   Hands: i.e. Fine Manipulations from Grasping:     Feet: i.e. Operating Foot Controls:     Driving / Operate Machinery:     Physician Name: Andres Glover D.O. Physician Signature: ANDRES Hsu D.O. e-Signature: Dr. Jasmeet Presley, Medical Director   Clinic Name / Location: [address],   [address] [place] [address] Clinic Phone Number: Dept: [phone]   Appointment Time: 4:30 Pm Visit Start Time: 4:31 PM   Check-In Time:  4:30 Pm Visit Discharge Time:  4:56pm   Original-Treating Physician or Chiropractor    Page 2-Insurer/TPA    Page 3-Employer    Page 4-Employee

## 2018-01-04 NOTE — PROGRESS NOTES
"Subjective:      Kirsty Landry is a 22 y.o. female who presents with Follow-Up ( [facility] DOI 11/22/17 - Feeling same- ROOM 3)      DOI 11/22/17: 22 year old female here regarding lower back injury. She was at work and tripped falling forwards landing on the large box, noted immediate mid and lower back pain. Patient states that overall her pain is about the same. Pain is mostly in her lower back at this point. Denies any radiating pain, numbness or tingling. Pain worse with certain movements, flexion and rotation. Patient taking diclofenac and Flexeril with some relief. Physical therapy has yet to be approved.     HPI    Review of Systems   Neurological: Negative for tingling, sensory change and focal weakness.     SOCHX: Works as a  at [employer]  FH: No pertinent family history to this problem.     Objective:     /72   Pulse 75   Temp 36.5 °C (97.7 °F)   Resp 16   Ht 1.676 m (5' 6\")   Wt 73.9 kg (163 lb)   SpO2 96%   BMI 26.31 kg/m²      Physical Exam   Constitutional: She is oriented to person, place, and time. She appears well-developed and well-nourished.   Cardiovascular: Normal rate.    Pulmonary/Chest: Effort normal. No respiratory distress.   Neurological: She is alert and oriented to person, place, and time.   Skin: Skin is warm and dry.   Psychiatric: She has a normal mood and affect. Judgment normal.       Lumbar/Thoracic: No gross deformity. Diffuse tenderness palpation bilateral paraspinal musculature throughout the entire thoracic and lumbar spine. Limited flexion. Straight leg test negative. Reflexes intact. Strength intact.         Assessment/Plan:     1. Lumbar strain, subsequent encounter  - predniSONE (DELTASONE) 20 MG Tab; Take 2 Tabs by mouth every day for 7 days.  Dispense: 14 Tab; Refill: 0  - cyclobenzaprine (FLEXERIL) 10 MG Tab; Take 1 Tab by mouth 3 times a day as needed for Moderate Pain or Muscle Spasms.  Dispense: 20 Tab; Refill: 0    2. Strain of thoracic region, " subsequent encounter  - predniSONE (DELTASONE) 20 MG Tab; Take 2 Tabs by mouth every day for 7 days.  Dispense: 14 Tab; Refill: 0  - cyclobenzaprine (FLEXERIL) 10 MG Tab; Take 1 Tab by mouth 3 times a day as needed for Moderate Pain or Muscle Spasms.  Dispense: 20 Tab; Refill: 0    Prescribed prednisone 40 mg ×7 days. Do not take diclofenac on this medication, but may resume after completing prescription  Refill Flexeril  Begin physical therapy when approved  Continue restricted duty  Follow-up 3 weeks

## 2018-01-17 ENCOUNTER — PHYSICAL THERAPY (OUTPATIENT)
Dept: PHYSICAL THERAPY | Facility: REHABILITATION | Age: 23
End: 2018-01-17
Attending: PREVENTIVE MEDICINE
Payer: COMMERCIAL

## 2018-01-17 DIAGNOSIS — S39.012D LUMBAR SPINE STRAIN, SUBSEQUENT ENCOUNTER: ICD-10-CM

## 2018-01-17 DIAGNOSIS — S39.012A BACK STRAIN, INITIAL ENCOUNTER: ICD-10-CM

## 2018-01-17 DIAGNOSIS — S29.019D STRAIN OF THORACIC SPINE, SUBSEQUENT ENCOUNTER: ICD-10-CM

## 2018-01-17 PROCEDURE — 97014 ELECTRIC STIMULATION THERAPY: CPT

## 2018-01-17 PROCEDURE — 97161 PT EVAL LOW COMPLEX 20 MIN: CPT

## 2018-01-17 PROCEDURE — 97110 THERAPEUTIC EXERCISES: CPT

## 2018-01-17 SDOH — ECONOMIC STABILITY: GENERAL: QUALITY OF LIFE: FAIR

## 2018-01-17 ASSESSMENT — ENCOUNTER SYMPTOMS
QUALITY: TIGHT
PAIN SCALE: 3
PAIN SCALE AT LOWEST: 3
QUALITY: ACHING
PAIN SCALE AT HIGHEST: 8

## 2018-01-17 NOTE — OP THERAPY EVALUATION
Outpatient Physical Therapy  INITIAL EVALUATION    [facility] Therapy [address].  [address]  [place] [address]  Phone:  [phone]  Fax:  [phone]    Date of Evaluation: 2018    Patient: Kirsty Landry  YOB: 1995  MRN: [id number]     Referring Provider: Andres Glover D.O.  [address] [place] [address] [address]  [place] [address]   Referring Diagnosis Strain of lumbar region, initial encounter [S39.012A];Lumbar strain, subsequent encounter [S39.012D];Strain of thoracic region, subsequent encounter [S29.019D]     Time Calculation  Start time: 225  Stop time: 328 Time Calculation (min): 63 minutes     Physical Therapy Occurrence Codes    Date of onset of impairment:  17   Date physical therapy care plan established or reviewed:  18   Date physical therapy treatment started:  18          Chief Complaint: No chief complaint on file.    Visit Diagnoses     ICD-10-CM   1. Back strain, initial encounter S39.012A   2. Lumbar spine strain, subsequent encounter S39.012D   3. Strain of thoracic spine, subsequent encounter S29.019D         Subjective:   History of Present Illness:     Date of onset:  2017    Mechanism of injury:  L foot got stuck between  Boxes and fell/twisted sideways over boxes.  Felt worse later that day and severe next day.  Relatively unchanged with flare ups since initial injury.  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 symptoms    Quality:  Aching and tight    Pain Comments::  AGGS: Lumbar  Drive > 5'  sit> 5  Artem shoes/socks  Stand>5' dishes  Lift > anything  from the ground  Sleep up >2/night  Diagnostic Tests:     None    Treatments:     Treatment Comments:  Patient is not working at the moment--no  "light-duty available  Patient Goals:     Patient goals for therapy:  Decreased pain and return to work      Past Medical History:   Diagnosis Date   • Acute low back pain 8/20/2014    X 1 week Cramping, suprpubic, L=R Pain in low back, most of the time 10/10 at it's worst 5/10 at it's best Tried ?asa, one time, before work - didn't help No BM changes Periods usu 4-7 days, usu once/month About 2 weeks ago, had one day of bleeding Sexually active, use condoms 100% of the time, one broke around 8/4   • Anxiety    • ASTHMA    • Depression     on OCPs   • Environmental allergies     mold, dust, grass   • Migraine    • Syncopes, vasovagal      Past Surgical History:   Procedure Laterality Date   • CHOLECYSTECTOMY       Social History   Substance Use Topics   • Smoking status: Never Smoker   • Smokeless tobacco: Never Used   • Alcohol use No     Family and Occupational History     Social History   • Marital status: Single     Spouse name: N/A   • Number of children: N/A   • Years of education: N/A     Occupational History   • 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       Objective     Palpation     Additional Palpation Details  Noted TTP  and hypomobility w/ p/a CTJ, t9-12, l2-4--guarding throughout     Erica LumbarTest     Lying repeated motions:   Repeat extension in lying     Symptoms during testing: increases    Symptoms after testing: better    Standing repeated motions:   Flexion in standing     Symptoms during testing: increases    Symptoms after testing: worse  Repeat extension in standing     Symptoms during testing: produces    Symptoms after testing: no effect    General Comments     Spine Comments   L/s AROM  Flex: 2\" above pat  EXT:3\" excursion --increased upper back  RSB: 4\" above pat--ERP  LSB: 3\" above pat--slight pain    Supine PROM: SLR  R:50--increase R buttock  L:45--increase L buttock    *seated LAQS--bilateral  80+ degrees active slr w/o pain        Therapeutic Treatments and Modalities:     Therapeutic " "Treatment and Modalities Summary: There ex: x15 min.   eval :  30min.    E-stim: 15 min.    HEIKE//increased, no change  REIL//increased, better  MELVI// L hip --> centralized--> REIL//centralized w/ increased mobility  \" less upper back, increased l/s  t-balls--hep  Wall angels--hep  [place] #2 --hourly    SEIL w/ [place] t/s and l/s multifidi x 15    //l/s 2/310, upper back 4/10--more in the middle      Assessment, Response and Plan:   Assessment details:  Patient presents t/s and l/s strain w/ significant muscle guarding.  Patient denies radiculopathy.  Poor posture awareness and body mechanics that may inhibit a faster roceovery.  Patient should do well if compliant with POC  Goals:   Short Term Goals:   Sleep through night  Artem shoes socks w/o pain  sit> 10'  Stand> 30'  Drive > 20'  Lift > 50lbs from the ground  Sleep up >5/night  Out of bed w.o pain  RMQ: < 10  Short term goal time span:  2-4 weeks      Long Term Goals:    Do laundry w/o pain  Dishes > 15' w/o pain  Walk > 1 hr w/o pain  RMQ: <5  Lift 20lbs x 5 friom floor without pain  Long term goal time span:  2-4 months    Plan:   Therapy options:  Physical therapy treatment to continue  Planned therapy interventions:  Therapeutic Activities (CPT 41803), Therapeutic Exercise (CPT 85149), E Stim Attended (CPT 43420), Manual Therapy (CPT 55251) and Mechanical Traction (CPT 48872)  Frequency:  2x week  Duration in weeks:  8  Duration in visits:  10  Plan details:  Core stab, roller, traction e-stim      Functional Limitation G-Codes and Severity Modifiers  PT Functional Assessment Tool Used: RMQ  PT Functional Assessment Score: 16/24  Current:     Goal:       Referring provider co-signature:  I have reviewed this plan of care and my co-signature certifies the need for services.      Physician Signature: ________________________________ Date: ______________     "

## 2018-01-19 ENCOUNTER — OCCUPATIONAL MEDICINE (OUTPATIENT)
Dept: OCCUPATIONAL MEDICINE | Facility: CLINIC | Age: 23
End: 2018-01-19
Payer: COMMERCIAL

## 2018-01-19 ENCOUNTER — PHYSICAL THERAPY (OUTPATIENT)
Dept: PHYSICAL THERAPY | Facility: REHABILITATION | Age: 23
End: 2018-01-19
Attending: PREVENTIVE MEDICINE
Payer: COMMERCIAL

## 2018-01-19 VITALS
TEMPERATURE: 97.6 F | BODY MASS INDEX: 25.71 KG/M2 | WEIGHT: 160 LBS | HEART RATE: 94 BPM | RESPIRATION RATE: 16 BRPM | HEIGHT: 66 IN | OXYGEN SATURATION: 98 % | DIASTOLIC BLOOD PRESSURE: 82 MMHG | SYSTOLIC BLOOD PRESSURE: 116 MMHG

## 2018-01-19 DIAGNOSIS — S39.012D LUMBAR STRAIN, SUBSEQUENT ENCOUNTER: ICD-10-CM

## 2018-01-19 DIAGNOSIS — S39.012A BACK STRAIN, INITIAL ENCOUNTER: ICD-10-CM

## 2018-01-19 DIAGNOSIS — S29.019D STRAIN OF THORACIC REGION, SUBSEQUENT ENCOUNTER: ICD-10-CM

## 2018-01-19 PROCEDURE — 99213 OFFICE O/P EST LOW 20 MIN: CPT | Performed by: PREVENTIVE MEDICINE

## 2018-01-19 PROCEDURE — 97014 ELECTRIC STIMULATION THERAPY: CPT

## 2018-01-19 PROCEDURE — 97110 THERAPEUTIC EXERCISES: CPT

## 2018-01-19 ASSESSMENT — ENCOUNTER SYMPTOMS
NAUSEA: 0
SENSORY CHANGE: 0
FOCAL WEAKNESS: 0
VOMITING: 0
FEVER: 0
CHILLS: 0
TINGLING: 0

## 2018-01-19 NOTE — LETTER
"[address],   [address] [place] [address]  Phone:  [phone] - Fax:  [phone]   [facility] Progress Report and Disability Certification  Date of Service: 1/19/2018   No Show:  No  Date / Time of Next Visit: 2/12/17 @ 4pm   Claim Information   Patient Name: Kirsty Landry  Claim Number:     Employer: [employer]  Date of Injury: 11/22/2017     Insurer / TPA: [employer]  ID / SSN:     Occupation:   Diagnosis: Diagnoses of Lumbar strain, subsequent encounter and Strain of thoracic region, subsequent encounter were pertinent to this visit.    Medical Information   Related to Industrial Injury? Yes    Subjective Complaints:  DOI 11/22/17: 22 year old female here regarding lower back injury. She was at work and tripped falling forwards landing on the large box, noted immediate mid and lower back pain. 6 patient states that overall symptoms are better. She states that he continues to have bilateral pain through thoracic and lumbar spine. Pain does not radiate down legs or arms. Denies any numbness or tingling. Patient has attended 2 sessions of physical therapy is unsure yet if there helping. Patient states that her work offered a light duty job but needs a \"released to work with restrictions,\" this has been the case with each appointment, she has been released to work with restrictions.   Objective Findings: Lumbar/Thoracic: No gross deformity. Tender to light touch, entire thoracic/lumbar musculature. Limited flexion.    Pre-Existing Condition(s):     Assessment:   Condition Same    Status: Additional Care Required  Permanent Disability:No    Plan:      Diagnostics:      Comments:  Referral for MRI given duration of symptoms  Continue physical therapy  Okay to return to work with restrictions  Follow-up 3 weeks    Disability Information   Status: Released to Restricted Duty    From:  1/19/2018  Through: 2/13/2018 " Restrictions are: Temporary   Physical Restrictions   Sitting:    Standing:    Stooping:    Bending:  < or = to 1 hr/day   Squatting:  < or = to 1 hr/day Walking:    Climbing:    Pushing:      Pulling:    Other:    Reaching Above Shoulder (L):   Reaching Above Shoulder (R):       Reaching Below Shoulder (L):    Reaching Below Shoulder (R):      Not to exceed Weight Limits   Carrying(hrs):   Weight Limit(lb):   Lifting(hrs):   Weight  Limit(lb):     Comments: Limit push/pull/lift to less than 10 lbs. Allow to sit/stand/walk as needed for comfort.      Repetitive Actions   Hands: i.e. Fine Manipulations from Grasping:     Feet: i.e. Operating Foot Controls:     Driving / Operate Machinery:     Physician Name: Andres Glover D.O. Physician Signature: ANDRES Hsu D.O. e-Signature: Dr. Jasmeet Presley, Medical Director   Clinic Name / Location: [address],   [address] [place] [address] Clinic Phone Number: Dept: [phone]   Appointment Time: 1:20 Pm Visit Start Time: 1:11 PM   Check-In Time:  1:08 Pm Visit Discharge Time:  1:34pm   Original-Treating Physician or Chiropractor    Page 2-Insurer/TPA    Page 3-Employer    Page 4-Employee

## 2018-01-19 NOTE — OP THERAPY DAILY TREATMENT
Outpatient Physical Therapy  DAILY TREATMENT     [facility] Physical Therapy [address].  [address]  [place] [address]  Phone:  [phone]  Fax:  [phone]    Date: 01/19/2018    Patient: Kirsty Landry  YOB: 1995  MRN: [id number]     Time Calculation  Start time: 1120  Stop time: 1200 Time Calculation (min): 40 minutes     Chief Complaint: No chief complaint on file.    Visit #: 2    SUBJECTIVE:  A little better but still sore    OBJECTIVE:      Therapeutic Treatments and Modalities:     Therapeutic Treatment and Modalities Summary: There ex: x25 min. .    E-stim: 15 min.    Nu step #x 10'  Roller: (focus chin down and shoulder blades at seven O'clock)  -alternate arm  -alternate punch  -angels    Esitm upper traps, t/s and l/s multifidi      Pain rating before treatment: 3  Pain rating after treatment: 3/10    ASSESSMENT:   Decreasing pain w/ increased tolerance to exercise, c/upper trap soreness but decreased mid t/s and l/s pain after treatment--noted dominant upper trap holding patterns.     PLAN/RECOMMENDATIONS:   Core stab, nu step, traction , e-stim?

## 2018-01-19 NOTE — PROGRESS NOTES
"Subjective:      Kirsty Landry is a 22 y.o. female who presents with Follow-Up ( DOI 01/17/2018 - Back - same - room 1)      DOI 11/22/17: 22 year old female here regarding lower back injury. She was at work and tripped falling forwards landing on the large box, noted immediate mid and lower back pain. 6 patient states that overall symptoms are better. She states that he continues to have bilateral pain through thoracic and lumbar spine. Pain does not radiate down legs or arms. Denies any numbness or tingling. Patient has attended 2 sessions of physical therapy is unsure yet if there helping. Patient states that her work offered a light duty job but needs a \"released to work with restrictions,\" this has been the case with each appointment, she has been released to work with restrictions.     HPI    Review of Systems   Constitutional: Negative for chills and fever.   Gastrointestinal: Negative for nausea and vomiting.   Neurological: Negative for tingling, sensory change and focal weakness.     SOCHX: Works as a  at [employer]   FH: No pertinent family history to this problem.     Objective:     /82   Pulse 94   Temp 36.4 °C (97.6 °F)   Resp 16   Ht 1.676 m (5' 6\")   Wt 72.6 kg (160 lb)   SpO2 98%   BMI 25.82 kg/m²      Physical Exam   Constitutional: She is oriented to person, place, and time. She appears well-developed and well-nourished.   Cardiovascular: Normal rate.    Pulmonary/Chest: Effort normal. No respiratory distress.   Neurological: She is alert and oriented to person, place, and time.   Skin: Skin is warm and dry.   Psychiatric: She has a normal mood and affect. Judgment normal.       Lumbar/Thoracic: No gross deformity. Tender to light touch, entire thoracic/lumbar musculature. Limited flexion.        Assessment/Plan:     1. Lumbar strain, subsequent encounter  - REFERRAL TO RADIOLOGY  - MR-LUMBAR SPINE-W/O; Future  - MR-THORACIC SPINE-W/O; Future    2. Strain of thoracic " region, subsequent encounter  - REFERRAL TO RADIOLOGY  - MR-LUMBAR SPINE-W/O; Future  - MR-THORACIC SPINE-W/O; Future    Referral for MRI given duration of symptoms  Continue physical therapy  Okay to return to work with restrictions  Follow-up 3 weeks

## 2018-01-24 ENCOUNTER — PHYSICAL THERAPY (OUTPATIENT)
Dept: PHYSICAL THERAPY | Facility: REHABILITATION | Age: 23
End: 2018-01-24
Attending: PREVENTIVE MEDICINE
Payer: COMMERCIAL

## 2018-01-24 DIAGNOSIS — S39.012A BACK STRAIN, INITIAL ENCOUNTER: ICD-10-CM

## 2018-01-24 PROCEDURE — 97014 ELECTRIC STIMULATION THERAPY: CPT

## 2018-01-24 PROCEDURE — 97110 THERAPEUTIC EXERCISES: CPT

## 2018-01-24 NOTE — OP THERAPY DAILY TREATMENT
"  Outpatient Physical Therapy  DAILY TREATMENT     [facility] Physical Therapy [address].  [address]  [place] [address]  Phone:  [phone]  Fax:  [phone]    Date: 01/24/2018    Patient: Kirsty Landry  YOB: 1995  MRN: [id number]     Time Calculation  Start time: 0248  Stop time: 0330 Time Calculation (min): 42 minutes     Chief Complaint: No chief complaint on file.    Visit #: 3    SUBJECTIVE:  Much better, no lbp now but some T/s pain  *patient was observed sitting a slouched position in chair in front of clinic  OBJECTIVE:      Therapeutic Treatments and Modalities:     Therapeutic Treatment and Modalities Summary: There ex: x25 min. .    E-stim: 15 min.    Nu step #x 12' w/ BFB to l/s//no pain\"  Roller: (focus chin down and shoulder blades at seven O'clock)  -alternate arm  -alternate punch  -angels  -marching  Bicycles x 30\"  Wall angels  Esitm upper traps, t/s and l/s multifidi  1/10--muscle are tired      Pain rating before treatment: 0  L/s  3/10 t/s  Pain rating after treatment: 1/10 much better    ASSESSMENT:   Improved core control and decreased pain with exercise. Cont. Poor body awareness    PLAN/RECOMMENDATIONS:   Core stab, nu step, , e-stim?    "

## 2018-01-26 ENCOUNTER — PHYSICAL THERAPY (OUTPATIENT)
Dept: PHYSICAL THERAPY | Facility: REHABILITATION | Age: 23
End: 2018-01-26
Attending: PREVENTIVE MEDICINE
Payer: COMMERCIAL

## 2018-01-26 DIAGNOSIS — S39.012A BACK STRAIN, INITIAL ENCOUNTER: ICD-10-CM

## 2018-01-26 DIAGNOSIS — S39.012D LUMBAR SPINE STRAIN, SUBSEQUENT ENCOUNTER: ICD-10-CM

## 2018-01-26 PROCEDURE — 97012 MECHANICAL TRACTION THERAPY: CPT

## 2018-01-26 PROCEDURE — 97110 THERAPEUTIC EXERCISES: CPT

## 2018-01-26 PROCEDURE — 97140 MANUAL THERAPY 1/> REGIONS: CPT

## 2018-01-27 NOTE — OP THERAPY DAILY TREATMENT
Outpatient Physical Therapy  DAILY TREATMENT     [facility] Physical [address].  [address]  [place] [address]  Phone:  [phone]  Fax:  [phone]    Date: 01/26/2018    Patient: Kirsty Landry  YOB: 1995  MRN: [id number]     Time Calculation  Start time: 0446  Stop time: 0526 Time Calculation (min): 40 minutes     Chief Complaint: No chief complaint on file.    Visit #: 4    SUBJECTIVE:  Still no lbp  but continueT/s pain    OBJECTIVE:      Therapeutic Treatments and Modalities:     Therapeutic Treatment and Modalities Summary:   There ex: x15 min. Man rx: 10'    E-stim: 15 min.    Nu step #x 15' /upper back is better  C/s raking c1-t-2  T/s mobs gd2-3 t1-3  manula traction?? Decreased mid scap pain  Mechanical traction  18/10 x 60/20 x 15' w/ mhp          Pain rating before treatment: 0  L/s  3/10 t/s  Pain rating after treatment:  neck is stif, t/s same 3/10, no lbp       ASSESSMENT:   decreased pain with exercise. Cont. fair body awareness, hypomobile uppper t/s    PLAN/RECOMMENDATIONS:   Roller, prone angels Core stab, nu step, ,tx?, t-balls

## 2018-01-31 ENCOUNTER — PHYSICAL THERAPY (OUTPATIENT)
Dept: PHYSICAL THERAPY | Facility: REHABILITATION | Age: 23
End: 2018-01-31
Attending: PREVENTIVE MEDICINE
Payer: COMMERCIAL

## 2018-01-31 DIAGNOSIS — S39.012A BACK STRAIN, INITIAL ENCOUNTER: ICD-10-CM

## 2018-01-31 DIAGNOSIS — S29.019D STRAIN OF THORACIC SPINE, SUBSEQUENT ENCOUNTER: ICD-10-CM

## 2018-01-31 PROCEDURE — 97140 MANUAL THERAPY 1/> REGIONS: CPT

## 2018-01-31 PROCEDURE — 97014 ELECTRIC STIMULATION THERAPY: CPT

## 2018-01-31 PROCEDURE — 97110 THERAPEUTIC EXERCISES: CPT

## 2018-01-31 NOTE — OP THERAPY DAILY TREATMENT
"  Outpatient Physical Therapy  DAILY TREATMENT     [facility] Physical Therapy [address].  [address]  [place] [address]  Phone:  [phone]  Fax:  [phone]    Date: 01/31/2018    Patient: Kirsty Landry  YOB: 1995  MRN: [id number]     Time Calculation  Start time: 0345  Stop time: 0435 Time Calculation (min): 50 minutes     Chief Complaint: neck pain, shoulder pain  Visit #: 5    SUBJECTIVE:  Pt states her left shoulder has been hurting some with the exercises. Pt didn't like the traction, neck was sore afterwards and she had a headache.     OBJECTIVE:      Therapeutic Treatments and Modalities:     1. E Stim Unattended (CPT 51542), 15 minutes, IFC to ct junction with MHP     2. Manual Therapy (CPT 07515), 10 minutes, ct mobs and upper t/s rib mobs    Therapeutic Treatment and Modalities Summary:   There ex: x20 min. .     Nustep L2 7 minutes  Roller: (focus chin down and shoulder blades at seven O'clock)  -alternate arm  -alternate punch  -angels  flasher  -marching  Bicycles x 30\"  Wall angels        Pain rating before treatment: 4  Pain rating after treatment: 3    ASSESSMENT:   pt's having c/o of left upper trap soreness today.     PLAN/RECOMMENDATIONS:   Continue to progress postural and core exercises.       "

## 2018-02-02 ENCOUNTER — PHYSICAL THERAPY (OUTPATIENT)
Dept: PHYSICAL THERAPY | Facility: REHABILITATION | Age: 23
End: 2018-02-02
Attending: PREVENTIVE MEDICINE
Payer: COMMERCIAL

## 2018-02-02 DIAGNOSIS — S39.012A BACK STRAIN, INITIAL ENCOUNTER: ICD-10-CM

## 2018-02-02 PROCEDURE — 97014 ELECTRIC STIMULATION THERAPY: CPT

## 2018-02-02 PROCEDURE — 97110 THERAPEUTIC EXERCISES: CPT

## 2018-02-02 NOTE — OP THERAPY DAILY TREATMENT
Outpatient Physical Therapy  DAILY TREATMENT     [facility] Physical Therapy [address].  [address]  [place] [address]  Phone:  [phone]  Fax:  [phone]    Date: 02/02/2018    Patient: Kirsty Landry  YOB: 1995  MRN: [id number]     Time Calculation  Start time: 0338  Stop time: 0434 Time Calculation (min): 56 minutes     Chief Complaint: neck pain, shoulder pain  Visit #: 6  SUBJECTIVE:  overal shoulder blade is unchanged..low back comes/goes but good today.  Most of the pain is left shoulder neck--trap  Patient reports only doing band ex. 2/day   OBJECTIVE:    Therapeutic Treatments and Modalities:     1. E Stim Unattended (CPT 08171), 15 minutes, IFC to ct junction with MHP     2. Manual Therapy (CPT 28336), 10 minutes, ct mobs and upper t/s rib mobs    Therapeutic Treatment and Modalities Summary:   There ex: x30 min. .     Nustep L4  14 minutes  Roller: (focus chin down and shoulder blades at seven O'clock)  -alternate arm  -alternate punch  -angels  [place]--#2  Wall angels  Wall sahrman w/ #2 in front of mirror  Prone angels--hep  t-balls--t/s  STM: L trap  Pain rating before treatment: 3/10 L shoulder estelle  Pain rating after treatment: 1-2--a little better    ASSESSMENT:   pt's c/o left upper trap soreness today. Cont. Dominant upper trap holding pattern. poor low trap recruitment  PLAN/RECOMMENDATIONS:   Increase scap stab, MFR/stm L trap

## 2018-02-07 ENCOUNTER — PHYSICAL THERAPY (OUTPATIENT)
Dept: PHYSICAL THERAPY | Facility: REHABILITATION | Age: 23
End: 2018-02-07
Attending: PREVENTIVE MEDICINE
Payer: COMMERCIAL

## 2018-02-07 DIAGNOSIS — S29.019D STRAIN OF THORACIC SPINE, SUBSEQUENT ENCOUNTER: ICD-10-CM

## 2018-02-07 DIAGNOSIS — S39.012A BACK STRAIN, INITIAL ENCOUNTER: ICD-10-CM

## 2018-02-07 PROCEDURE — 97110 THERAPEUTIC EXERCISES: CPT

## 2018-02-07 PROCEDURE — 97140 MANUAL THERAPY 1/> REGIONS: CPT

## 2018-02-07 PROCEDURE — 97014 ELECTRIC STIMULATION THERAPY: CPT

## 2018-02-08 NOTE — OP THERAPY DAILY TREATMENT
Outpatient Physical Therapy  DAILY TREATMENT     [facility] Physical Therapy [address]  [address]  [place] [address]  Phone:  [phone]  Fax:  [phone]    Date: 02/07/2018    Patient: Kirsty Landry  YOB: 1995  MRN: [id number]     Time Calculation  Start time: 0320  Stop time: 0403 Time Calculation (min): 43 minutes     Chief Complaint: neck pain, shoulder pain  Visit #: 7  SUBJECTIVE:  overal shoulder blade is unchanged..low back comes/goes but good today.  Most of the pain is left shoulder neck--trap  Patient reports only doing band ex. 2/day   OBJECTIVE:    Therapeutic Treatments and Modalities:     Therapeutic Treatment and Modalities Summary:   There ex: x10'   Man:  15  E-stim: 15'    ube 10'  C/s raking'sub occip relase  stm trap  IASTM: bilat. C/s and trap  Scottish to cs/ multifidi and trap    //neck 3/10, slight h/a from laying face down, shoulder 2/10  Pain rating before treatment: 3/10 L shoulder estelle  Pain rating after treatment: 1-2--a little better    ASSESSMENT:   pt's c/o bilateral upper trapd sub occip  soreness today. Cont. Dominant upper trap holding pattern. poor low trap recruitment--poor overall posture and fair- hep compliance  PLAN/RECOMMENDATIONS:   Increase scap stab, MFR--scap

## 2018-02-09 ENCOUNTER — PHYSICAL THERAPY (OUTPATIENT)
Dept: PHYSICAL THERAPY | Facility: REHABILITATION | Age: 23
End: 2018-02-09
Attending: PREVENTIVE MEDICINE
Payer: COMMERCIAL

## 2018-02-09 DIAGNOSIS — S39.012A BACK STRAIN, INITIAL ENCOUNTER: ICD-10-CM

## 2018-02-09 DIAGNOSIS — S29.019D STRAIN OF THORACIC SPINE, SUBSEQUENT ENCOUNTER: ICD-10-CM

## 2018-02-09 PROCEDURE — 97014 ELECTRIC STIMULATION THERAPY: CPT

## 2018-02-09 PROCEDURE — 97110 THERAPEUTIC EXERCISES: CPT

## 2018-02-09 NOTE — OP THERAPY DAILY TREATMENT
"   Outpatient Physical Therapy  DAILY TREATMENT     [facility] Physical Therapy [address].  [address]  [place] [address]  Phone:  [phone]  Fax:  [phone]    Date: 02/09/2018    Patient: Kirsty Landry  YOB: 1995  MRN: [id number]     Time Calculation  Start time: 1549  Stop time: 1645 Time Calculation (min): 56 minutes     Chief Complaint: neck pain, shoulder pain  Visit #: 8  SUBJECTIVE:  Things are better --everything is about a 2/10 shoulder  Blade and neck  OBJECTIVE:    Therapeutic Treatments and Modalities:     Therapeutic Treatment and Modalities Summary:   There ex: x25 '   Man:  15    Nu step #6 x 11'  Wall christoph--hep  sahrmans-hep  Walk back christoph w/ #2 --hep  Czech to cs/ multifidi and traps x 15 w/ mhp  --reviewed hep, posture  //neck 1-2/10, slight h/a from laying face down, shoulder 3/10 low back good  NPDI 26/50  RMQ 8/24  Pain rating before treatment: 2/10 L shoulder estelle/neck  Pain rating after treatment: 1-2--a little better  **patient did not initially mention L trapezius pain but stated in middle of treatment that trapezius was really sore to touch and was sore to touch since last visit.  arom c/s  Bilateral =/> 75 deg  Flexion 2\" off of sternum  Ext: 7\" off of sternum--increased neck pain  ASSESSMENT:   Patient still c/o pain 2/10 t/s/neck/low back.  Patient demosntrates good rom for c/s and shoulder despite patient pointing to trap/neck and stated 5/10 to touch.   Most  Of pain appears to myofascial in nature.  Patient states that she does exercise 90 minutes a day and given plateau over past 3 visits I am unable to explain plateau.  Patient tolerates exercise well w/ expected fatigue complaints w/o antalgia or avoidance behavior. But, patient continues to report high pain sensitivity to touch and reports limited driving to 5 min., 5-10 washing dishes and unable to sleep at night.  Patient struggle to perform HEP correctly and required " concisistent v/c to perform ex. Properly.   PLAN/RECOMMENDATIONS:   Discharging patient from therapy due to plateau.

## 2018-02-10 NOTE — OP THERAPY DISCHARGE SUMMARY
"  Outpatient Physical Therapy  DISCHARGE SUMMARY NOTE      [facility] Therapy [place] [address]  [address]  [place] [address]  Phone:  [phone]  Fax:  [phone]    Date of Visit: 02/09/2018    Patient: Kirsty Landry  YOB: 1995  MRN: [id number]     Referring Provider: Andres Glover D.O.  [address] [place] [address] [address]  [place] [address]   Referring Diagnosis Physical therapy     Physical Therapy Occurrence Codes    Date of onset of impairment:  11/22/17   Date physical therapy care plan established or reviewed:  1/17/18   Date physical therapy treatment started:  1/17/18          Functional Limitation G-Codes and Severity Modifiers  PT Functional Assessment Tool Used: rmq--NPDI  PT Functional Assessment Score: 8/24--26/50  Goal:     Discharge:      **patient did not initially mention L trapezius pain but stated in middle of treatment that trapezius was really sore to touch and was sore to touch since last visit.  arom c/s  Bilateral =/> 75 deg  Flexion 2\" off of sternum  Ext: 7\" off of sternum--increased neck pain  ASSESSMENT:   Patient still c/o pain 2/10 t/s/neck/low back.  Patient demosntrates good rom for c/s and shoulder despite patient pointing to trap/neck and stated 5/10 to touch.   Most  Of pain appears to myofascial in nature.  Patient states that she does exercise 90 minutes a day and given plateau over past 3 visits I am unable to explain plateau.  Patient tolerates exercise well w/ expected fatigue complaints w/o antalgia or avoidance behavior. But, patient continues to report high pain sensitivity to touch and reports limited driving to 5 min., 5-10 washing dishes and unable to sleep at night.  Patient struggle to perform HEP correctly and required concisistent v/c to perform ex. Properly.   PLAN/RECOMMENDATIONS:   Discharging patient from therapy due to plateau.    Galindo Canela, PT, DPT, OCS    Date: 2/9/2018  "

## 2018-02-12 ENCOUNTER — OCCUPATIONAL MEDICINE (OUTPATIENT)
Dept: OCCUPATIONAL MEDICINE | Facility: CLINIC | Age: 23
End: 2018-02-12
Payer: COMMERCIAL

## 2018-02-12 VITALS
HEIGHT: 66 IN | HEART RATE: 96 BPM | TEMPERATURE: 98.5 F | BODY MASS INDEX: 25.71 KG/M2 | DIASTOLIC BLOOD PRESSURE: 86 MMHG | OXYGEN SATURATION: 100 % | WEIGHT: 160 LBS | SYSTOLIC BLOOD PRESSURE: 132 MMHG

## 2018-02-12 DIAGNOSIS — S39.012D LUMBAR STRAIN, SUBSEQUENT ENCOUNTER: ICD-10-CM

## 2018-02-12 DIAGNOSIS — S29.019D STRAIN OF THORACIC REGION, SUBSEQUENT ENCOUNTER: ICD-10-CM

## 2018-02-12 DIAGNOSIS — S16.1XXD STRAIN OF NECK MUSCLE, SUBSEQUENT ENCOUNTER: ICD-10-CM

## 2018-02-12 PROCEDURE — 99214 OFFICE O/P EST MOD 30 MIN: CPT | Performed by: PREVENTIVE MEDICINE

## 2018-02-12 ASSESSMENT — PAIN SCALES - GENERAL: PAINLEVEL: 5=MODERATE PAIN

## 2018-02-12 ASSESSMENT — ENCOUNTER SYMPTOMS
SENSORY CHANGE: 0
FOCAL WEAKNESS: 0
TINGLING: 0
HEADACHES: 1

## 2018-02-12 NOTE — LETTER
[address],   [address] [place] [address]  Phone:  [phone] - Fax:  [phone]   [facility] Progress Report and Disability Certification  Date of Service: 2/12/2018   No Show:  No  Date / Time of Next Visit: 3/13/2018 @ 4pm   Claim Information   Patient Name: Kirsty Landry  Claim Number:     Employer: [employer]  Date of Injury: 11/22/2017     Insurer / TPA: [employer]  ID / SSN:     Occupation:   Diagnosis: Diagnoses of Lumbar strain, subsequent encounter, Strain of thoracic region, subsequent encounter, and Strain of neck muscle, subsequent encounter were pertinent to this visit.    Medical Information   Related to Industrial Injury? No    Subjective Complaints:  DOI 11/22/17: 22 year old female here regarding lower back injury. She was at work and tripped falling forwards landing on the large box, noted immediate mid and lower back pain. Patient states that pain and lower back is a little bit improved but continues to be present. She notes more of her pain from her shoulders up to her neck. Pain does not radiate. Denies any numbness or tingling. Neck pain especially concerned benign. She has completed physical therapy with minimal improvement.   Objective Findings: Lumbar/Thoracic: No gross deformity. Slight tenderness to light touch increasing in the thoracic area. Limited flexion. Reflexes intact. Strength intact.   Cervical: No gross deformity. Tender throughout paraspinal musculature bilaterally. Limited flexion and rotation bilaterally. Reflexes intact. Strength intact.   Pre-Existing Condition(s):     Assessment:   Condition Same    Status: Additional Care Required  Permanent Disability:No    Plan:      Diagnostics:      Comments:  MRI Thoracic: Slight scoliosis, no other findings  MRI Lumbar: L5-S1 disc bulge. Mild anterolisthesis L5-S1. Bilateral neuroforaminal narrowing.  Referral for MRI  C-spine  Referral to physiatry  Continue restricted duty  Follow up 4 weeks    Disability Information   Status: Released to Restricted Duty    From:  2/12/2018  Through: 3/13/2018 Restrictions are: Temporary   Physical Restrictions   Sitting:    Standing:    Stooping:    Bending:  < or = to 1 hr/day   Squatting:  < or = to 1 hr/day Walking:    Climbing:    Pushing:      Pulling:    Other:    Reaching Above Shoulder (L):   Reaching Above Shoulder (R):       Reaching Below Shoulder (L):    Reaching Below Shoulder (R):      Not to exceed Weight Limits   Carrying(hrs):   Weight Limit(lb):   Lifting(hrs):   Weight  Limit(lb):     Comments: Limit push/pull/lift to less than 10 lbs. Allow to sit/stand/walk as needed for comfort.     Repetitive Actions   Hands: i.e. Fine Manipulations from Grasping:     Feet: i.e. Operating Foot Controls:     Driving / Operate Machinery:     Physician Name: Andres Maya D.O. Physician Signature: [email or web address]-SignANDRES MAYA D.O. e-Signature: Dr. Jasmeet Presley, Medical Director   Clinic Name / Location: [address],   [address]  [place] [address] Clinic Phone Number: Dept: [phone]   Appointment Time: 4:00 Pm Visit Start Time: 3:42 PM   Check-In Time:  3:39 Pm Visit Discharge Time:  4:42pm   Original-Treating Physician or Chiropractor    Page 2-Insurer/TPA    Page 3-Employer    Page 4-Employee

## 2018-02-13 NOTE — PROGRESS NOTES
"Subjective:      Kirsty Landry is a 22 y.o. female who presents with Follow-Up ( DOI 11/22/2017 - Back - Same - ROOM 1)      DOI 11/22/17: 22 year old female here regarding lower back injury. She was at work and tripped falling forwards landing on the large box, noted immediate mid and lower back pain. Patient states that pain and lower back is a little bit improved but continues to be present. She notes more of her pain from her shoulders up to her neck. Pain does not radiate. Denies any numbness or tingling. Neck pain especially concerned benign. She has completed physical therapy with minimal improvement.     HPI    Review of Systems   Neurological: Positive for headaches. Negative for tingling, sensory change and focal weakness.     SOCHX: Works as a  at [employer]   FH: No pertinent family history to this problem.     Objective:     /86   Pulse 96   Temp 36.9 °C (98.5 °F)   Ht 1.676 m (5' 6\")   Wt 72.6 kg (160 lb)   SpO2 100%   BMI 25.82 kg/m²      Physical Exam   Constitutional: She is oriented to person, place, and time. She appears well-developed and well-nourished.   Cardiovascular: Normal rate.    Pulmonary/Chest: Effort normal.   Neurological: She is alert and oriented to person, place, and time.   Skin: Skin is warm and dry.   Psychiatric: She has a normal mood and affect. Judgment normal.       Lumbar/Thoracic: No gross deformity. Slight tenderness to light touch increasing in the thoracic area. Limited flexion. Reflexes intact. Strength intact.   Cervical: No gross deformity. Tender throughout paraspinal musculature bilaterally. Limited flexion and rotation bilaterally. Reflexes intact. Strength intact.       Assessment/Plan:     1. Lumbar strain, subsequent encounter  - REFERRAL TO PHYSIATRY (PMR)    2. Strain of thoracic region, subsequent encounter  - REFERRAL TO PHYSIATRY (PMR)    3. Strain of neck muscle, subsequent encounter  - REFERRAL TO RADIOLOGY  - " MR-CERVICAL SPINE-W/O; Future  - REFERRAL TO PHYSIATRY (PMR)    MRI Thoracic: Slight scoliosis, no other findings  MRI Lumbar: L5-S1 disc bulge. Mild anterolisthesis L5-S1. Bilateral neuroforaminal narrowing.  Referral for MRI C-spine  Referral to physiatry  Continue restricted duty  Follow up 4 weeks    Patient has noted occasional mild neck pain since the incident but is as noted much more severe lumbar and thoracic pain until this visit.

## 2018-03-13 ENCOUNTER — OCCUPATIONAL MEDICINE (OUTPATIENT)
Dept: OCCUPATIONAL MEDICINE | Facility: CLINIC | Age: 23
End: 2018-03-13
Payer: COMMERCIAL

## 2018-03-13 VITALS
BODY MASS INDEX: 25.71 KG/M2 | HEART RATE: 63 BPM | DIASTOLIC BLOOD PRESSURE: 78 MMHG | SYSTOLIC BLOOD PRESSURE: 108 MMHG | WEIGHT: 160 LBS | TEMPERATURE: 98.1 F | HEIGHT: 66 IN | RESPIRATION RATE: 16 BRPM | OXYGEN SATURATION: 98 %

## 2018-03-13 DIAGNOSIS — S29.019D STRAIN OF THORACIC REGION, SUBSEQUENT ENCOUNTER: ICD-10-CM

## 2018-03-13 DIAGNOSIS — S39.012D LUMBAR STRAIN, SUBSEQUENT ENCOUNTER: ICD-10-CM

## 2018-03-13 PROCEDURE — 99212 OFFICE O/P EST SF 10 MIN: CPT | Performed by: PREVENTIVE MEDICINE

## 2018-03-13 NOTE — PROGRESS NOTES
"Subjective:      Kirsty Landry is a 23 y.o. female who presents with Follow-Up ( DOI 11/22/17 lower back feeling worse room 3)      DOI 11/22/17: 22 year old female here regarding lower back injury. She was at work and tripped falling forwards landing on the large box, noted immediate mid and lower back pain. MRI Thoracic: Slight scoliosis, no other findings. MRI Lumbar: L5-S1 disc bulge. Mild anterolisthesis L5-S1. Bilateral neuroforaminal narrowing. Patient states that overall symptoms about the same, but did worsen over the last few days. She noted increased pain in lower back and increased numbness and tingling down both legs. She knows she is limping on the right leg. She states that her company transition to a new work comp insurance so referral has not been approved yet.     HPI    ROS       Objective:     /78   Pulse 63   Temp 36.7 °C (98.1 °F)   Resp 16   Ht 1.676 m (5' 6\")   Wt 72.6 kg (160 lb)   SpO2 98%   BMI 25.82 kg/m²      Physical Exam    Lumbar/Thoracic: No gross deformity. Limited flexion to 45° and rotation bilaterally.. Normal gait.  Cervical: No gross deformity. Somewhat limited rotation bilaterally..        Assessment/Plan:     1. Lumbar strain, subsequent encounter    2. Strain of thoracic region, subsequent encounter    Referral to physiatry pending  MRI cervical spine pending  Continue restricted duty  Follow-up in 6 weeks if not seen by physiatry      "

## 2018-03-13 NOTE — LETTER
[address],   [address] [place] [address]  Phone:  [phone] - Fax:  [phone]   [facility] Progress Report and Disability Certification  Date of Service: 3/13/2018   No Show:  No  Date / Time of Next Visit: 4/24/2018@4:00 PM   Claim Information   Patient Name: Kirsty Landry  Claim Number:     Employer: [employer]  Date of Injury: 11/22/2017     Insurer / TPA: Laura  ID / SSN:     Occupation:   Diagnosis: Diagnoses of Lumbar strain, subsequent encounter and Strain of thoracic region, subsequent encounter were pertinent to this visit.    Medical Information   Related to Industrial Injury? No    Subjective Complaints:  DOI 11/22/17: 22 year old female here regarding lower back injury. She was at work and tripped falling forwards landing on the large box, noted immediate mid and lower back pain. MRI Thoracic: Slight scoliosis, no other findings. MRI Lumbar: L5-S1 disc bulge. Mild anterolisthesis L5-S1. Bilateral neuroforaminal narrowing. Patient states that overall symptoms about the same, but did worsen over the last few days. She noted increased pain in lower back and increased numbness and tingling down both legs. She knows she is limping on the right leg. She states that her company transition to a new work comp insurance so referral has not been approved yet.   Objective Findings: Lumbar/Thoracic: No gross deformity. Limited flexion to 45° and rotation bilaterally.. Normal gait.  Cervical: No gross deformity. Somewhat limited rotation bilaterally..    Pre-Existing Condition(s):     Assessment:   Condition Same    Status: Additional Care Required  Permanent Disability:No    Plan:      Diagnostics:      Comments:  Referral to physiatry pending  MRI cervical spine pending  Continue restricted duty  Follow-up in 6 weeks if not seen by physiatry    Disability Information   Status: Released to Restricted Duty    From:   3/13/2018  Through: 4/24/2018 Restrictions are: Temporary   Physical Restrictions   Sitting:    Standing:    Stooping:    Bending:  < or = to 1 hr/day   Squatting:    Walking:    Climbing:    Pushing:      Pulling:  < or = to 1 hr/day Other:    Reaching Above Shoulder (L):   Reaching Above Shoulder (R):       Reaching Below Shoulder (L):    Reaching Below Shoulder (R):      Not to exceed Weight Limits   Carrying(hrs):   Weight Limit(lb): < or = to 10 pounds Lifting(hrs):   Weight  Limit(lb): < or = to 10 pounds   Comments: Limit push/pull/lift to less than 10 lbs. Allow to sit/stand/walk as needed for comfort.      Repetitive Actions   Hands: i.e. Fine Manipulations from Grasping:     Feet: i.e. Operating Foot Controls:     Driving / Operate Machinery:     Physician Name: Andres Glover D.O. Physician Signature: ANDRES Hsu D.O. e-Signature: Dr. Jasmeet Presley, Medical Director   Clinic Name / Location: [address],   [address] [address] Clinic Phone Number: Dept: [phone]   Appointment Time: 4:00 Pm Visit Start Time: 3:57 PM   Check-In Time:  3:54 Pm Visit Discharge Time:  4:22 PM   Original-Treating Physician or Chiropractor    Page 2-Insurer/TPA    Page 3-Employer    Page 4-Employee

## 2018-04-02 ENCOUNTER — OCCUPATIONAL MEDICINE (OUTPATIENT)
Dept: OCCUPATIONAL MEDICINE | Facility: CLINIC | Age: 23
End: 2018-04-02
Payer: COMMERCIAL

## 2018-04-02 VITALS
HEART RATE: 76 BPM | BODY MASS INDEX: 25.71 KG/M2 | WEIGHT: 160 LBS | HEIGHT: 66 IN | SYSTOLIC BLOOD PRESSURE: 110 MMHG | DIASTOLIC BLOOD PRESSURE: 60 MMHG | RESPIRATION RATE: 12 BRPM | OXYGEN SATURATION: 98 % | TEMPERATURE: 97.9 F

## 2018-04-02 DIAGNOSIS — S29.019D STRAIN OF THORACIC REGION, SUBSEQUENT ENCOUNTER: ICD-10-CM

## 2018-04-02 DIAGNOSIS — S39.012D LUMBAR STRAIN, SUBSEQUENT ENCOUNTER: ICD-10-CM

## 2018-04-02 PROCEDURE — 99212 OFFICE O/P EST SF 10 MIN: CPT | Performed by: PREVENTIVE MEDICINE

## 2018-04-02 ASSESSMENT — PAIN SCALES - GENERAL: PAINLEVEL: 8=MODERATE-SEVERE PAIN

## 2018-04-02 NOTE — LETTER
[address],   [address] [place] [place] [address]  Phone:  [phone] - Fax:  [phone]   [facility] Progress Report and Disability Certification  Date of Service: 4/2/2018   No Show:  No  Date / Time of Next Visit:  Care transferred to physiatry   Claim Information   Patient Name: Kirsty Landry  Claim Number:     Employer: [employer]  Date of Injury: 11/22/2017     Insurer / TPA: Laura  ID / SSN:     Occupation:   Diagnosis: Diagnoses of Lumbar strain, subsequent encounter and Strain of thoracic region, subsequent encounter were pertinent to this visit.    Medical Information   Related to Industrial Injury? No    Subjective Complaints:  DOI 11/22/17: 22 year old female here regarding lower back injury. She was at work and tripped falling forwards landing on the large box, noted immediate mid and lower back pain. MRI Thoracic: Slight scoliosis, no other findings. MRI Lumbar: L5-S1 disc bulge. Mild anterolisthesis L5-S1. Bilateral neuroforaminal narrowing. Patient states that as she was put on light duty last week and symptoms have worsened since then. She states she has pain with any bending, movement, climbing stairs, bending, putting on clothes, walking and sitting. Patient has appointment with physiatry next week.   Objective Findings: Lumbar/Thoracic: No gross deformity. Severely limited range of motion.. Antalgic gait.  Cervical: No gross deformity. Moderately limited range of motion bilaterally..    Pre-Existing Condition(s):     Assessment:   Condition Worsened    Status: Discharged / Care Transfer  Permanent Disability:No    Plan:      Diagnostics:      Comments:  Keep appointment with physiatry, transfer care  recommend OTC capsaicin patch to use as needed  OTC ibuprofen and/or Tylenol  Continue restricted duty, in place until seen by physiatry  Follow-up as needed    Disability Information   Status: Released to Restricted  Duty    From:  4/2/2018  Through:   Restrictions are: Temporary   Physical Restrictions   Sitting:  < or = to 2 hrs/day Standing:  < or = to 2 hrs/day Stooping:  < or = to 1 hr/day Bending:  < or = to 1 hr/day   Squatting:    Walking:  < or = to 2 hrs/day Climbing:  < or = to 1 hr/day Pushing:      Pulling:    Other:    Reaching Above Shoulder (L):   Reaching Above Shoulder (R):       Reaching Below Shoulder (L):    Reaching Below Shoulder (R):      Not to exceed Weight Limits   Carrying(hrs):   Weight Limit(lb): < or = to 10 pounds Lifting(hrs):   Weight  Limit(lb): < or = to 10 pounds   Comments: Limit push/pull/lift to less than 10 lbs. Allow to sit/stand/walk as needed for comfort. Restrictions in place until cleared by physiatry    Repetitive Actions   Hands: i.e. Fine Manipulations from Grasping:     Feet: i.e. Operating Foot Controls:     Driving / Operate Machinery:     Physician Name: Andres Glover D.O. Physician Signature: ANDRES Hsu D.O. e-Signature: Dr. Jasmeet Presley, Medical Director   Clinic Name / Location: [address],   [address] [place] [address] Clinic Phone Number: Dept: [phone]   Appointment Time: 1:35 Pm Visit Start Time: 1:35 PM   Check-In Time:  1:31 Pm Visit Discharge Time:  2:25pm   Original-Treating Physician or Chiropractor    Page 2-Insurer/TPA    Page 3-Employer    Page 4-Employee

## 2018-04-02 NOTE — PROGRESS NOTES
"Subjective:      Kirsty Landry is a 23 y.o. female who presents with Follow-Up ( DOI 11/22/2018 - Back - worse - room 3)      DOI 11/22/17: 22 year old female here regarding lower back injury. She was at work and tripped falling forwards landing on the large box, noted immediate mid and lower back pain. MRI Thoracic: Slight scoliosis, no other findings. MRI Lumbar: L5-S1 disc bulge. Mild anterolisthesis L5-S1. Bilateral neuroforaminal narrowing. Patient states that as she was put on light duty last week and symptoms have worsened since then. She states she has pain with any bending, movement, climbing stairs, bending, putting on clothes, walking and sitting. Patient has appointment with physiatry next week.     HPI    ROS       Objective:     /60   Pulse 76   Temp 36.6 °C (97.9 °F)   Resp 12   Ht 1.676 m (5' 6\")   Wt 72.6 kg (160 lb)   SpO2 98%   BMI 25.82 kg/m²      Physical Exam    Lumbar/Thoracic: No gross deformity. Severely limited range of motion.. Antalgic gait.  Cervical: No gross deformity. Moderately limited range of motion bilaterally..        Assessment/Plan:     1. Lumbar strain, subsequent encounter  Keep appointment with physiatry, transfer care  recommend OTC capsaicin patch to use as needed  OTC ibuprofen and/or Tylenol  Continue restricted duty, in place until seen by physiatry  Follow-up as needed    2. Strain of thoracic region, subsequent encounter        "

## 2019-02-25 ENCOUNTER — OFFICE VISIT (OUTPATIENT)
Dept: URGENT CARE | Facility: PHYSICIAN GROUP | Age: 24
End: 2019-02-25

## 2019-02-25 VITALS
TEMPERATURE: 98.4 F | RESPIRATION RATE: 16 BRPM | HEART RATE: 74 BPM | WEIGHT: 168 LBS | SYSTOLIC BLOOD PRESSURE: 96 MMHG | DIASTOLIC BLOOD PRESSURE: 56 MMHG | BODY MASS INDEX: 27.12 KG/M2 | OXYGEN SATURATION: 97 %

## 2019-02-25 DIAGNOSIS — J02.9 SORE THROAT: ICD-10-CM

## 2019-02-25 LAB
INT CON NEG: NORMAL
INT CON POS: NORMAL
S PYO AG THROAT QL: NEGATIVE

## 2019-02-25 PROCEDURE — 87880 STREP A ASSAY W/OPTIC: CPT | Performed by: PHYSICIAN ASSISTANT

## 2019-02-25 PROCEDURE — 99214 OFFICE O/P EST MOD 30 MIN: CPT | Performed by: PHYSICIAN ASSISTANT

## 2019-02-25 RX ORDER — FLUTICASONE PROPIONATE 50 MCG
1 SPRAY, SUSPENSION (ML) NASAL DAILY
COMMUNITY

## 2019-02-25 RX ORDER — METHYLPREDNISOLONE 4 MG/1
TABLET ORAL
Qty: 1 KIT | Refills: 0 | Status: SHIPPED | OUTPATIENT
Start: 2019-02-25 | End: 2019-03-05

## 2019-02-25 RX ORDER — OMEGA-3 FATTY ACIDS/FISH OIL 300-1000MG
CAPSULE ORAL
COMMUNITY

## 2019-02-25 ASSESSMENT — ENCOUNTER SYMPTOMS
EYE REDNESS: 0
SPUTUM PRODUCTION: 0
SORE THROAT: 1
EYE PAIN: 0
COUGH: 1
HEADACHES: 0
STRIDOR: 0
CHILLS: 0
FEVER: 0
EYE DISCHARGE: 0
SHORTNESS OF BREATH: 0
PALPITATIONS: 0
HEMOPTYSIS: 0
WHEEZING: 0

## 2019-02-26 NOTE — PROGRESS NOTES
Subjective:      Kirsty Landry is a 24 y.o. female who presents with Sore Throat (x 1 week R>L); Ear Pain (x 1 week); Cough (x 1 week); and Nasal Congestion (x 1 week)            Pharyngitis    This is a new problem. The current episode started in the past 7 days. The problem has been unchanged. Associated symptoms include congestion, coughing and ear pain. Pertinent negatives include no ear discharge, headaches, shortness of breath or stridor. She has tried nothing for the symptoms.       Review of Systems   Constitutional: Positive for malaise/fatigue. Negative for chills and fever.   HENT: Positive for congestion, ear pain and sore throat. Negative for ear discharge.    Eyes: Negative for pain, discharge and redness.   Respiratory: Positive for cough. Negative for hemoptysis, sputum production, shortness of breath, wheezing and stridor.    Cardiovascular: Negative for chest pain and palpitations.   Skin: Negative for itching and rash.   Neurological: Negative for headaches.   All other systems reviewed and are negative.    PMH:  has a past medical history of Acute low back pain (8/20/2014); Anxiety; ASTHMA; Depression; Environmental allergies; Migraine; and Syncopes, vasovagal.  MEDS:   Current Outpatient Prescriptions:   •  Ibuprofen 200 MG Cap, Take  by mouth., Disp: , Rfl:   •  fluticasone (FLONASE) 50 MCG/ACT nasal spray, Spray 1 Spray in nose every day., Disp: , Rfl:   •  MethylPREDNISolone (MEDROL DOSEPAK) 4 MG Tablet Therapy Pack, Take as directed on package., Disp: 1 Kit, Rfl: 0  •  norelgestromin-ethinyl estradiol (ORTHO EVRA) 150-35 MCG/24HR patch, Apply 1 Patch to skin as directed every 7 days., Disp: 12 Patch, Rfl: 4  •  cyclobenzaprine (FLEXERIL) 10 MG Tab, Take 1 Tab by mouth 3 times a day as needed for Moderate Pain or Muscle Spasms. (Patient not taking: Reported on 2/25/2019), Disp: 20 Tab, Rfl: 0  •  diclofenac EC (VOLTAREN) 75 MG Tablet Delayed Response, Take 1 Tab by mouth 2 times a day.  (Patient not taking: Reported on 2/25/2019), Disp: 60 Tab, Rfl: 0  •  norelgestromin-ethinyl estradiol (ORTHO EVRA) 150-35 MCG/24HR patch, Apply 1 Patch to skin as directed every 7 days. (Patient not taking: Reported on 2/25/2019), Disp: 12 Patch, Rfl: 4  ALLERGIES:   Allergies   Allergen Reactions   • Cillins [Penicillins]    • Codeine    • Sulfa Drugs      SURGHX:   Past Surgical History:   Procedure Laterality Date   • CHOLECYSTECTOMY       SOCHX:  reports that she has never smoked. She has never used smokeless tobacco. She reports that she does not drink alcohol or use drugs.  FH: Family history was reviewed, no pertinent findings to report  Medications, Allergies, and current problem list reviewed today in Epic       Objective:     BP (!) 96/56 (BP Location: Left arm, Patient Position: Sitting, BP Cuff Size: Adult)   Pulse 74   Temp 36.9 °C (98.4 °F) (Temporal)   Resp 16   Wt 76.2 kg (168 lb)   SpO2 97%   BMI 27.12 kg/m²      Physical Exam   Constitutional: She is oriented to person, place, and time. She appears well-developed and well-nourished.  Non-toxic appearance. She does not have a sickly appearance. She does not appear ill. No distress.   HENT:   Head: Normocephalic and atraumatic.   Right Ear: Hearing, tympanic membrane, external ear and ear canal normal.   Left Ear: Hearing, tympanic membrane, external ear and ear canal normal.   Nose: Nose normal.   Mouth/Throat: Uvula is midline and mucous membranes are normal. Posterior oropharyngeal erythema present. No oropharyngeal exudate, posterior oropharyngeal edema or tonsillar abscesses.   Neck: Normal range of motion. Neck supple. No JVD present. No tracheal deviation present. No thyromegaly present.   Cardiovascular: Regular rhythm and normal heart sounds.  Exam reveals no gallop and no friction rub.    No murmur heard.  Pulmonary/Chest: Effort normal and breath sounds normal. No stridor. No respiratory distress. She has no wheezes. She has no  rales. She exhibits no tenderness.   Lymphadenopathy:     She has no cervical adenopathy.   Neurological: She is alert and oriented to person, place, and time.   Skin: Skin is warm and dry.   Psychiatric: She has a normal mood and affect. Her behavior is normal. Judgment and thought content normal.   Vitals reviewed.           Rapid Strep:NEG  Assessment/Plan:     1. Sore throat    - MethylPREDNISolone (MEDROL DOSEPAK) 4 MG Tablet Therapy Pack; Take as directed on package.  Dispense: 1 Kit; Refill: 0    Differential diagnosis, natural history, supportive care discussed. Follow-up with primary care provider within 7-10 days, emergency room precautions discussed.  Patient and/or family appears understanding of information.  Handout and review of patients diagnosis and treatment was discussed extensively.

## 2019-03-05 ENCOUNTER — OFFICE VISIT (OUTPATIENT)
Dept: URGENT CARE | Facility: PHYSICIAN GROUP | Age: 24
End: 2019-03-05

## 2019-03-05 VITALS
RESPIRATION RATE: 20 BRPM | TEMPERATURE: 98 F | HEIGHT: 67 IN | HEART RATE: 104 BPM | DIASTOLIC BLOOD PRESSURE: 82 MMHG | OXYGEN SATURATION: 98 % | BODY MASS INDEX: 26.93 KG/M2 | SYSTOLIC BLOOD PRESSURE: 126 MMHG | WEIGHT: 171.6 LBS

## 2019-03-05 DIAGNOSIS — J22 ACUTE RESPIRATORY INFECTION: ICD-10-CM

## 2019-03-05 DIAGNOSIS — R05.9 COUGH: ICD-10-CM

## 2019-03-05 PROCEDURE — 99214 OFFICE O/P EST MOD 30 MIN: CPT | Performed by: FAMILY MEDICINE

## 2019-03-05 RX ORDER — BENZONATATE 200 MG/1
CAPSULE ORAL
Qty: 30 CAP | Refills: 0 | Status: SHIPPED | OUTPATIENT
Start: 2019-03-05

## 2019-03-05 RX ORDER — AZITHROMYCIN 250 MG/1
TABLET, FILM COATED ORAL
Qty: 6 TAB | Refills: 0 | Status: SHIPPED | OUTPATIENT
Start: 2019-03-05

## 2019-03-05 NOTE — PROGRESS NOTES
Chief Complaint:    Chief Complaint   Patient presents with   • Cough     sore throat, congestion-symptoms present during last visit here haven gotten worse x1       History of Present Illness:    She has been sick for 2 weeks. Still has persisting intermittent fever, chills, nasal symptoms with purulent mucus from nose, and cough productive of purulent mucus. She was seen here on 2/25/19 and rx'd Medrol Dose John which she took. This helped her sore throat, but not the other symptoms. Overall symptoms are at least moderate severity and getting worse. Z-john and Tessalon have worked/tolerated for similar previous symptoms.      Review of Systems:    Constitutional: See HPI.  Eyes: Negative for change in vision, photophobia, pain, redness, and discharge.  ENT: See HPI.  Respiratory: See HPI.   Cardiovascular: Negative for chest pain, palpitations, orthopnea, claudication, leg swelling, and PND.   Gastrointestinal: Negative for abdominal pain, nausea, vomiting, diarrhea, constipation, blood in stool, and melena.   Genitourinary: Negative for dysuria, urinary urgency, urinary frequency, hematuria, and flank pain.   Musculoskeletal: Negative for myalgias, joint pain, neck pain, and back pain.   Skin: Negative for rash and itching.   Neurological: Negative for dizziness, tingling, tremors, sensory change, speech change, focal weakness, seizures, loss of consciousness, and headaches.   Endo: Negative for polydipsia.   Heme: Does not bruise/bleed easily.   Psychiatric/Behavioral: Negative for depression, suicidal ideas, hallucinations, memory loss and substance abuse. The patient is not nervous/anxious and does not have insomnia.        Past Medical History:    Past Medical History:   Diagnosis Date   • Acute low back pain 8/20/2014    X 1 week Cramping, suprpubic, L=R Pain in low back, most of the time 10/10 at it's worst 5/10 at it's best Tried ?asa, one time, before work - didn't help No BM changes Periods usu 4-7 days,  usu once/month About 2 weeks ago, had one day of bleeding Sexually active, use condoms 100% of the time, one broke around 8/4   • Anxiety    • ASTHMA    • Depression     on OCPs   • Environmental allergies     mold, dust, grass   • Migraine    • Syncopes, vasovagal      Past Surgical History:    Past Surgical History:   Procedure Laterality Date   • CHOLECYSTECTOMY       Social History:    Social History     Social History   • Marital status: Single     Spouse name: N/A   • Number of children: N/A   • Years of education: N/A     Occupational History   • 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     Social History Main Topics   • Smoking status: Never Smoker   • Smokeless tobacco: Never Used   • Alcohol use No   • Drug use: No   • Sexual activity: Yes     Partners: Male     Birth control/ protection: Coitus Interruptus     Other Topics Concern   • Not on file     Social History Narrative    Lives with mother, family     Family History:    Family History   Problem Relation Age of Onset   • Diabetes Mother    • Heart Disease Mother         MI- age 46, 37 yo   • Diabetes Maternal Grandmother    • Heart Disease Maternal Grandmother    • Hypertension Maternal Grandmother    • Stroke Maternal Grandmother    • Diabetes Maternal Grandfather    • Heart Disease Maternal Grandfather    • Hypertension Maternal Grandfather    • Stroke Maternal Grandfather    • Hypertension Father    • Hyperlipidemia Father    • Cancer Paternal Grandfather        Medications:    Current Outpatient Prescriptions on File Prior to Visit   Medication Sig Dispense Refill   • norelgestromin-ethinyl estradiol (ORTHO EVRA) 150-35 MCG/24HR patch Apply 1 Patch to skin as directed every 7 days. 12 Patch 4   • Ibuprofen 200 MG Cap Take  by mouth.     • fluticasone (FLONASE) 50 MCG/ACT nasal spray Spray 1 Spray in nose every day.       No current facility-administered medications on file prior to visit.      Allergies:    Allergies   Allergen Reactions   • Cillins  "[Penicillins]    • Codeine    • Sulfa Drugs        Vitals:    Vitals:    03/05/19 1410   BP: 126/82   Pulse: (!) 104   Resp: 20   Temp: 36.7 °C (98 °F)   SpO2: 98%   Weight: 77.8 kg (171 lb 9.6 oz)   Height: 1.702 m (5' 7\")       Physical Exam:    Constitutional: Vital signs reviewed. Appears well-developed and well-nourished. Frequent coughing.   Eyes: Sclera white, conjunctivae clear.   ENT: External ears normal. External auditory canals normal without discharge. TMs translucent and non-bulging. Hearing normal. Nasal mucosa pink. Lips/teeth are normal. Oral mucosa pink and moist. Posterior pharynx: WNL.  Neck: Neck supple.   Cardiovascular: Tachycardic. Regular rhythm. No murmur.  Pulmonary/Chest: Respirations non-labored. Clear to auscultation bilaterally.  Lymph: Cervical nodes without tenderness or enlargement.  Musculoskeletal: Normal gait. Normal range of motion. No muscular atrophy or weakness.  Neurological: Alert and oriented to person, place, and time. Muscle tone normal. Coordination normal.   Skin: No rashes or lesions. Warm, dry, normal turgor.  Psychiatric: Normal mood and affect. Behavior is normal. Judgment and thought content normal.       Assessment / Plan:    1. Acute respiratory infection  - azithromycin (ZITHROMAX) 250 MG Tab; 2 TABS ON DAY 1, 1 TAB ON DAYS 2-5.  Dispense: 6 Tab; Refill: 0    2. Cough  - benzonatate (TESSALON) 200 MG capsule; 1 CAP UP TO 3 TIMES A DAY ONLY IF NEEDED FOR COUGH.  Dispense: 30 Cap; Refill: 0      Work note given - excuse for 3/5 thru and including 3/8/19. May return sooner if feeling better.    Discussed with her DDX, management options, and risks, benefits, and alternatives to treatment plan agreed upon.    Agreeable to medications prescribed.    Discussed expected course of duration, time for improvement, and to seek follow-up in Emergency Room, urgent care, or with PCP if getting worse at any time or not improving within expected time frame.  "

## 2019-03-05 NOTE — LETTER
March 5, 2019         Patient: Kirsty Landry   YOB: 1995   Date of Visit: 3/5/2019           To Whom it May Concern:    Kirsty Landry was seen in my clinic on 3/5/2019.     Please excuse from work for 3/5 thru and including 3/8/19 due to medical condition.    May return sooner if feeling better.    If you have any questions or concerns, please don't hesitate to call.        Sincerely,           Michi Victor M.D.  Electronically Signed

## 2019-07-03 NOTE — MR AVS SNAPSHOT
"Kirsty Landry   2017 5:35 PM   Office Visit   MRN: [id number]    Department:  [facility]   Dept Phone:  [phone]    Description:  Female : 1995   Provider:  Elroy Bush M.D.           Reason for Visit     Nausea C/o N/V, diarrhea, headache, weakness due to not being able to eat, poss fever, chills x2 days      Allergies as of 2017     Allergen Noted Reactions    Cillins [Penicillins] 2011       Codeine 2011       Sulfa Drugs 2011         You were diagnosed with     Nausea   [507503]         Vital Signs     Blood Pressure Pulse Temperature Respirations Height Weight    94/66 mmHg 85 37.4 °C (99.3 °F) 16 1.689 m (5' 6.5\") 70.308 kg (155 lb)    Body Mass Index Oxygen Saturation Last Menstrual Period Smoking Status          24.65 kg/m2 98% 2017 Never Smoker         Basic Information     Date Of Birth Sex Race Ethnicity Preferred Language    1995 Female White Non- English      Your appointments     2017  1:00 PM   Established Patient with RAJIV Napoles   [facility])    [address] [place] [id number] [phone]           You will be receiving a confirmation call a few days before your appointment from our automated call confirmation system.              Problem List              ICD-10-CM Priority Class Noted - Resolved    Family history of early CAD Z82.49   3/7/2011 - Present    History of depression Z86.59   3/7/2011 - Present    Moderate persistent asthma J45.40   Unknown - Present    Bilateral chronic knee pain M25.561, M25.562, G89.29   10/22/2013 - Present    Acute low back pain M54.5   2014 - Present    Encounter for routine gynecological examination Z01.419   2015 - Present    Moderate cervical dysplasia N87.1   2016 - Present    Encounter for surveillance of contraceptives Z30.40   2016 - Present    Bilateral foot pain M79.671, M79.672   2016 - " This show is very slight anemia,    Made results to Patient and fax to primary MD thanks Present    Need for HPV vaccination Z23   8/5/2016 - Present    Memory changes R41.3   10/12/2016 - Present      Health Maintenance        Date Due Completion Dates    IMM HEP B VACCINE (1 of 3 - Primary Series) 1995 ---    IMM HEP A VACCINE (1 of 2 - Standard Series) 2/25/1994 ---    IMM VARICELLA (CHICKENPOX) VACCINE (1 of 2 - 2 Dose Adolescent Series) 2/25/2006 ---    IMM DTaP/Tdap/Td Vaccine (1 - Tdap) 2/25/2012 ---    IMM PNEUMOCOCCAL 19-64 (ADULT) MEDIUM RISK SERIES (1 of 1 - PPSV23) 2/25/2012 ---    PAP SMEAR 1/12/2018 1/12/2015            Results     POCT Influenza A/B      Component    Rapid Influenza A-B    Negative    Internal Control Positive    Positive    Internal Control Negative    Negative                        Current Immunizations     HPV 9-VALENT VACCINE (GARDASIL 9) 8/5/2016 11:15 AM, 4/1/2016, 2/1/2016  4:02 PM      Below and/or attached are the medications your provider expects you to take. Review all of your home medications and newly ordered medications with your provider and/or pharmacist. Follow medication instructions as directed by your provider and/or pharmacist. Please keep your medication list with you and share with your provider. Update the information when medications are discontinued, doses are changed, or new medications (including over-the-counter products) are added; and carry medication information at all times in the event of emergency situations     Allergies:  CILLINS - (reactions not documented)     CODEINE - (reactions not documented)     SULFA DRUGS - (reactions not documented)               Medications  Valid as of: April 17, 2017 -  7:20 PM    Generic Name Brand Name Tablet Size Instructions for use    Albuterol Sulfate (Aero Soln) albuterol 108 (90 BASE) MCG/ACT Inhale 2 Puffs by mouth every 6 hours as needed for Shortness of Breath.        Bismuth Subsalicylate   Take  by mouth.        Cetirizine HCl (Tab) ZYRTEC 10 MG Take 1 Tab by mouth every day.         Fluticasone Propionate (Suspension) FLONASE 50 MCG/ACT Spray 2 Sprays in nose as needed. Each Nostril        Fluticasone Propionate (Suspension) FLONASE 50 MCG/ACT Spray 1 Spray in nose 2 times a day.        Fluticasone Propionate HFA (Aerosol) FLOVENT HFA 44 MCG/ACT Inhale 2 Puffs by mouth 2 times a day.        Montelukast Sodium (Tab) SINGULAIR 10 MG Take 1 Tab by mouth every day.        Norelgestromin-Eth Estradiol (PATCH WEEKLY) ORTHO EVRA 150-35 MCG/24HR Apply 1 Patch to skin as directed every 7 days.        Norelgestromin-Eth Estradiol (PATCH WEEKLY) ORTHO EVRA 150-35 MCG/24HR Apply 1 Patch to skin as directed every 7 days.        Pseudoeph-Doxylamine-DM-APAP   Take  by mouth.        Pseudoephedrine-APAP-DM   Take  by mouth.        .                 Medicines prescribed today were sent to:     [facility] [address] [place] [id number]    Phone: [phone] Fax: [phone]    Open 24 Hours?: No      Medication refill instructions:       If your prescription bottle indicates you have medication refills left, it is not necessary to call your provider’s office. Please contact your pharmacy and they will refill your medication.    If your prescription bottle indicates you do not have any refills left, you may request refills at any time through one of the following ways: The online Flirq system (except Urgent Care), by calling your provider’s office, or by asking your pharmacy to contact your provider’s office with a refill request. Medication refills are processed only during regular business hours and may not be available until the next business day. Your provider may request additional information or to have a follow-up visit with you prior to refilling your medication.   *Please Note: Medication refills are assigned a new Rx number when refilled electronically. Your pharmacy may indicate that no refills were authorized even though a new  prescription for the same medication is available at the pharmacy. Please request the medicine by name with the pharmacy before contacting your provider for a refill.           Flimmer Access Code: [email or web address]  Expires: 4/21/2017  4:01 PM    Flimmer  A secure, online tool to manage your health information     [employer]’s Flimmer® is a secure, online tool that connects you to your personalized health information from the privacy of your home -- day or night - making it very easy for you to manage your healthcare. Once the activation process is completed, you can even access your medical information using the Flimmer john, which is available for free in the Apple John store or Google Play store.     Flimmer provides the following levels of access (as shown below):   My Chart Features   [facility] Doctor [facility]  Specialists [facility]  Urgent  Care Non-[facility]  Primary Care  Doctor   Email your healthcare team securely and privately 24/7 X X X    Manage appointments: schedule your next appointment; view details of past/upcoming appointments X      Request prescription refills. X      View recent personal medical records, including lab and immunizations X X X X   View health record, including health history, allergies, medications X X X X   Read reports about your outpatient visits, procedures, consult and ER notes X X X X   See your discharge summary, which is a recap of your hospital and/or ER visit that includes your diagnosis, lab results, and care plan. X X       How to register for Flimmer:  1. Go to  [email or web address].  2. Click on the Sign Up Now box, which takes you to the New Member Sign Up page. You will need to provide the following information:  a. Enter your Flimmer Access Code exactly as it appears at the top of this page. (You will not need to use this code after you’ve completed the sign-up process. If you do not sign up before the expiration date, you must request a new code.)    b. Enter your date of birth.   c. Enter your home email address.   d. Click Submit, and follow the next screen’s instructions.  3. Create a Source4Style ID. This will be your Source4Style login ID and cannot be changed, so think of one that is secure and easy to remember.  4. Create a [employer] password. You can change your password at any time.  5. Enter your Password Reset Question and Answer. This can be used at a later time if you forget your password.   6. Enter your e-mail address. This allows you to receive e-mail notifications when new information is available in Source4Style.  7. Click Sign Up. You can now view your health information.    For assistance activating your Source4Style account, call [phone]

## 2020-03-27 ENCOUNTER — OFFICE VISIT (OUTPATIENT)
Dept: URGENT CARE | Facility: PHYSICIAN GROUP | Age: 25
End: 2020-03-27

## 2020-03-27 VITALS
OXYGEN SATURATION: 97 % | TEMPERATURE: 97.8 F | WEIGHT: 182 LBS | SYSTOLIC BLOOD PRESSURE: 122 MMHG | DIASTOLIC BLOOD PRESSURE: 78 MMHG | BODY MASS INDEX: 28.51 KG/M2 | HEART RATE: 77 BPM | RESPIRATION RATE: 16 BRPM

## 2020-03-27 DIAGNOSIS — J06.9 VIRAL URI WITH COUGH: ICD-10-CM

## 2020-03-27 PROCEDURE — 99214 OFFICE O/P EST MOD 30 MIN: CPT | Performed by: PHYSICIAN ASSISTANT

## 2020-03-27 RX ORDER — BENZONATATE 100 MG/1
100-200 CAPSULE ORAL 3 TIMES DAILY PRN
Qty: 60 CAP | Refills: 0 | Status: SHIPPED | OUTPATIENT
Start: 2020-03-27

## 2020-03-27 ASSESSMENT — ENCOUNTER SYMPTOMS
HEARTBURN: 0
DIARRHEA: 0
HEADACHES: 0
MUSCULOSKELETAL NEGATIVE: 1
SPUTUM PRODUCTION: 0
SHORTNESS OF BREATH: 0
RHINORRHEA: 1
ABDOMINAL PAIN: 0
SORE THROAT: 0
WHEEZING: 0
COUGH: 1
VOMITING: 0
DIZZINESS: 0
CHILLS: 0
NAUSEA: 0
FEVER: 0

## 2020-03-27 ASSESSMENT — COPD QUESTIONNAIRES: COPD: 0

## 2020-03-27 NOTE — PROGRESS NOTES
Subjective:      Kirsty Landry is a 25 y.o. female who presents with Cough (chest congestion, SOB, headaches, tired x2 days )            Cough   This is a new problem. The current episode started yesterday. The problem has been unchanged. The problem occurs every few minutes. The cough is non-productive. Associated symptoms include rhinorrhea. Pertinent negatives include no chest pain, chills, ear pain, fever, headaches, heartburn, nasal congestion, postnasal drip, sore throat, shortness of breath or wheezing. Nothing aggravates the symptoms. She has tried OTC cough suppressant for the symptoms. The treatment provided mild relief. Her past medical history is significant for asthma and bronchitis. There is no history of COPD or pneumonia.     Patient denies fevers, chills, body aches, chest pain, wheezing, or SOB. No history of smoking. She does have a history of asthma and bronchitis. No known sick contacts, recent travel, or recent use of antibiotics.     Review of Systems   Constitutional: Negative for chills and fever.   HENT: Positive for rhinorrhea. Negative for congestion, ear pain, postnasal drip and sore throat.    Respiratory: Positive for cough. Negative for sputum production, shortness of breath and wheezing.    Cardiovascular: Negative for chest pain.   Gastrointestinal: Negative for abdominal pain, diarrhea, heartburn, nausea and vomiting.   Genitourinary: Negative.    Musculoskeletal: Negative.    Neurological: Negative for dizziness and headaches.        Objective:     /78   Pulse 77   Temp 36.6 °C (97.8 °F)   Resp 16   Wt 82.6 kg (182 lb)   SpO2 97%   BMI 28.51 kg/m²      Physical Exam  Vitals signs and nursing note reviewed.   Constitutional:       General: She is not in acute distress.     Appearance: Normal appearance. She is well-developed. She is not diaphoretic.   HENT:      Head: Normocephalic and atraumatic.      Right Ear: Hearing, tympanic membrane, ear canal and external  ear normal. There is no impacted cerumen.      Left Ear: Hearing, tympanic membrane, ear canal and external ear normal. There is no impacted cerumen.      Nose: Nose normal. No congestion or rhinorrhea.      Mouth/Throat:      Mouth: Mucous membranes are moist.      Pharynx: No oropharyngeal exudate or posterior oropharyngeal erythema.   Eyes:      General:         Right eye: No discharge.         Left eye: No discharge.      Conjunctiva/sclera: Conjunctivae normal.      Pupils: Pupils are equal, round, and reactive to light.   Neck:      Musculoskeletal: Normal range of motion.   Cardiovascular:      Rate and Rhythm: Normal rate and regular rhythm.      Heart sounds: Normal heart sounds. No murmur.   Pulmonary:      Effort: Pulmonary effort is normal.      Breath sounds: Normal breath sounds. No wheezing or rales.      Comments: Dry cough present throughout exam  Musculoskeletal: Normal range of motion.   Skin:     General: Skin is warm and dry.   Neurological:      Mental Status: She is alert and oriented to person, place, and time.   Psychiatric:         Behavior: Behavior normal.            PMH:  has a past medical history of Acute low back pain (8/20/2014), Anxiety, ASTHMA, Depression, Environmental allergies, Migraine, and Syncopes, vasovagal.  MEDS:   Current Outpatient Medications:   •  benzonatate (TESSALON) 100 MG Cap, Take 1-2 Caps by mouth 3 times a day as needed., Disp: 60 Cap, Rfl: 0  •  azithromycin (ZITHROMAX) 250 MG Tab, 2 TABS ON DAY 1, 1 TAB ON DAYS 2-5. (Patient not taking: Reported on 3/27/2020), Disp: 6 Tab, Rfl: 0  •  benzonatate (TESSALON) 200 MG capsule, 1 CAP UP TO 3 TIMES A DAY ONLY IF NEEDED FOR COUGH. (Patient not taking: Reported on 3/27/2020), Disp: 30 Cap, Rfl: 0  •  Ibuprofen 200 MG Cap, Take  by mouth., Disp: , Rfl:   •  fluticasone (FLONASE) 50 MCG/ACT nasal spray, Spray 1 Spray in nose every day., Disp: , Rfl:   •  norelgestromin-ethinyl estradiol (ORTHO EVRA) 150-35 MCG/24HR  patch, Apply 1 Patch to skin as directed every 7 days. (Patient not taking: Reported on 3/27/2020), Disp: 12 Patch, Rfl: 4  ALLERGIES:   Allergies   Allergen Reactions   • Cillins [Penicillins]    • Codeine    • Sulfa Drugs      SURGHX:   Past Surgical History:   Procedure Laterality Date   • CHOLECYSTECTOMY       SOCHX:  reports that she has never smoked. She has never used smokeless tobacco. She reports that she does not drink alcohol or use drugs.  FH: family history includes Cancer in her paternal grandfather; Diabetes in her maternal grandfather, maternal grandmother, and mother; Heart Disease in her maternal grandfather, maternal grandmother, and mother; Hyperlipidemia in her father; Hypertension in her father, maternal grandfather, and maternal grandmother; Stroke in her maternal grandfather and maternal grandmother.       Assessment/Plan:       1. Viral URI with cough    - benzonatate (TESSALON) 100 MG Cap; Take 1-2 Caps by mouth 3 times a day as needed.  Dispense: 60 Cap; Refill: 0    n95 mask worn for the entirety of today's visit. Advised patient symptoms are most likely viral in etiology, recommend supportive care. Increased fluids and rest. Tessalon perles as needed for symptomatic relief. Isolation precautions discussed and information handout provided today. Call or return to office if symptoms persist or worsen. The patient demonstrated a good understanding and agreed with the treatment plan.

## 2024-04-29 ENCOUNTER — OFFICE VISIT (OUTPATIENT)
Dept: URGENT CARE | Facility: PHYSICIAN GROUP | Age: 29
End: 2024-04-29
Payer: MEDICAID

## 2024-04-29 ENCOUNTER — HOSPITAL ENCOUNTER (OUTPATIENT)
Facility: MEDICAL CENTER | Age: 29
End: 2024-04-29
Attending: FAMILY MEDICINE
Payer: MEDICAID

## 2024-04-29 VITALS
HEIGHT: 67 IN | RESPIRATION RATE: 16 BRPM | TEMPERATURE: 97.1 F | BODY MASS INDEX: 31.39 KG/M2 | OXYGEN SATURATION: 94 % | DIASTOLIC BLOOD PRESSURE: 72 MMHG | WEIGHT: 200 LBS | SYSTOLIC BLOOD PRESSURE: 96 MMHG | HEART RATE: 87 BPM

## 2024-04-29 DIAGNOSIS — R19.7 DIARRHEA, UNSPECIFIED TYPE: ICD-10-CM

## 2024-04-29 DIAGNOSIS — J02.9 PHARYNGITIS, UNSPECIFIED ETIOLOGY: ICD-10-CM

## 2024-04-29 LAB
C PARVUM AG STL QL IA.RAPID: NEGATIVE
FLUAV RNA SPEC QL NAA+PROBE: NEGATIVE
FLUBV RNA SPEC QL NAA+PROBE: NEGATIVE
G LAMBLIA AG STL QL IA.RAPID: NEGATIVE
RSV RNA SPEC QL NAA+PROBE: NEGATIVE
S PYO DNA SPEC NAA+PROBE: NOT DETECTED
SARS-COV-2 RNA RESP QL NAA+PROBE: NEGATIVE

## 2024-04-29 PROCEDURE — 87651 STREP A DNA AMP PROBE: CPT | Performed by: FAMILY MEDICINE

## 2024-04-29 PROCEDURE — 87328 CRYPTOSPORIDIUM AG IA: CPT

## 2024-04-29 PROCEDURE — 87899 AGENT NOS ASSAY W/OPTIC: CPT

## 2024-04-29 PROCEDURE — 87046 STOOL CULTR AEROBIC BACT EA: CPT

## 2024-04-29 PROCEDURE — 87637 SARSCOV2&INF A&B&RSV AMP PRB: CPT | Mod: QW | Performed by: FAMILY MEDICINE

## 2024-04-29 PROCEDURE — 3078F DIAST BP <80 MM HG: CPT | Performed by: FAMILY MEDICINE

## 2024-04-29 PROCEDURE — 99204 OFFICE O/P NEW MOD 45 MIN: CPT | Performed by: FAMILY MEDICINE

## 2024-04-29 PROCEDURE — 87045 FECES CULTURE AEROBIC BACT: CPT

## 2024-04-29 PROCEDURE — 3074F SYST BP LT 130 MM HG: CPT | Performed by: FAMILY MEDICINE

## 2024-04-29 PROCEDURE — 87329 GIARDIA AG IA: CPT

## 2024-04-29 RX ORDER — BENZONATATE 200 MG/1
200 CAPSULE ORAL 3 TIMES DAILY PRN
Qty: 45 CAPSULE | Refills: 0 | Status: SHIPPED | OUTPATIENT
Start: 2024-04-29 | End: 2024-05-08 | Stop reason: SDUPTHER

## 2024-04-29 RX ORDER — ALBUTEROL SULFATE 90 UG/1
2 AEROSOL, METERED RESPIRATORY (INHALATION) EVERY 6 HOURS PRN
COMMUNITY
End: 2024-05-08 | Stop reason: SDUPTHER

## 2024-04-29 RX ORDER — METHYLPREDNISOLONE 4 MG/1
TABLET ORAL
Qty: 21 TABLET | Refills: 0 | Status: SHIPPED | OUTPATIENT
Start: 2024-04-29 | End: 2024-05-08 | Stop reason: SDUPTHER

## 2024-04-29 NOTE — RESULT ENCOUNTER NOTE
Please call pt:        Rapid strep negative.       Follow up in one week if no improvement, sooner if symptoms worsen.

## 2024-04-29 NOTE — RESULT ENCOUNTER NOTE
Please call pt:      PCR negative for COVID, influenza A/B, RSV, strep throat.    Follow up in one week if no improvement, sooner if symptoms worsen.

## 2024-04-29 NOTE — PROGRESS NOTES
Chief Complaint   Patient presents with    Pharyngitis    Cough     Cough since January   Getting worse since Thursday         Cough  This is a new problem. The current episode started 4 days ago. The problem has been unchanged. The problem occurs constantly. The cough is dry.   Cough x 2 mths.          She denies: fatigue, muscle aches, fever or sob. Pertinent negatives include no   headaches, nausea, vomiting, diarrhea, sweats, weight loss or wheezing. Nothing aggravates the symptoms.  Patient has tried nothing for the symptoms. There is no history of asthma.            #2.   Diarrhea:   x 3 wk.   3-5 times per day.  Denies blood in stool.   Denies recent travel.   Denies recent antibiotic use in past 2 mths.            Past Medical History:   Diagnosis Date    GERD (gastroesophageal reflux disease) 03/2023    Acute low back pain 08/20/2014    X 1 week Cramping, suprpubic, L=R Pain in low back, most of the time 10/10 at it's worst 5/10 at it's best Tried ?asa, one time, before work - didn't help No BM changes Periods usu 4-7 days, usu once/month About 2 weeks ago, had one day of bleeding Sexually active, use condoms 100% of the time, one broke around 8/4    Anxiety     ASTHMA     Depression     on OCPs    Environmental allergies     mold, dust, grass    Migraine     Syncopes, vasovagal          Social History     Tobacco Use    Smoking status: Never    Smokeless tobacco: Never   Vaping Use    Vaping Use: Never used   Substance Use Topics    Alcohol use: Yes     Comment: occ    Drug use: Yes     Types: Marijuana         Current Outpatient Medications on File Prior to Visit   Medication Sig Dispense Refill    albuterol 108 (90 Base) MCG/ACT Aero Soln inhalation aerosol Inhale 2 Puffs every 6 hours as needed for Shortness of Breath.      benzonatate (TESSALON) 100 MG Cap Take 1-2 Caps by mouth 3 times a day as needed. 60 Cap 0    Ibuprofen 200 MG Cap Take  by mouth.      fluticasone (FLONASE) 50 MCG/ACT nasal  "spray Spray 1 Spray in nose every day.      norelgestromin-ethinyl estradiol (ORTHO EVRA) 150-35 MCG/24HR patch Apply 1 Patch to skin as directed every 7 days. 12 Patch 4    azithromycin (ZITHROMAX) 250 MG Tab 2 TABS ON DAY 1, 1 TAB ON DAYS 2-5. (Patient not taking: Reported on 3/27/2020) 6 Tab 0    benzonatate (TESSALON) 200 MG capsule 1 CAP UP TO 3 TIMES A DAY ONLY IF NEEDED FOR COUGH. (Patient not taking: Reported on 3/27/2020) 30 Cap 0     No current facility-administered medications on file prior to visit.                    Review of Systems   Constitutional: Negative for fever and weight loss.   HENT: negative for otalgia  Cardiovascular - denies chest pain or dyspnea  Respiratory: Positive for cough.  .  Negative for wheezing.    Neurological: Negative for headaches.   GI - denies nausea, vomiting or diarrhea  Neuro - denies numbness or tingling.            Objective:     BP 96/72   Pulse 87   Temp 36.2 °C (97.1 °F)   Resp 16   Ht 1.702 m (5' 7\")   Wt 90.7 kg (200 lb)   SpO2 94%     Physical Exam   Constitutional: patient is oriented to person, place, and time. Patient appears well-developed and well-nourished. No distress.   HENT:   Head: Normocephalic and atraumatic.   Right Ear: External ear normal.   Left Ear: External ear normal.   Nose: Mucosal edema  present. Right sinus exhibits no maxillary sinus tenderness. Left sinus exhibits no maxillary sinus tenderness.   Mouth/Throat: Mucous membranes are normal. No oral lesions.  No posterior pharyngeal erythema.  No oropharyngeal exudate or posterior oropharyngeal edema.  Tonsils 1+  Eyes: Conjunctivae and EOM are normal. Pupils are equal, round, and reactive to light. Right eye exhibits no discharge. Left eye exhibits no discharge. No scleral icterus.   Neck: Normal range of motion. Neck supple. No tracheal deviation present.   Cardiovascular: Normal rate, regular rhythm and normal heart sounds.  Exam reveals no friction rub.    Pulmonary/Chest: " Effort normal. No respiratory distress. Patient has no wheezes or rhonchi. Patient has no rales.    Musculoskeletal:  exhibits no edema.   Lymphadenopathy:     Patient has no cervical adenopathy.      Neurological: patient is alert and oriented to person, place, and time.   Skin: Skin is warm and dry. No rash noted. No erythema.   Psychiatric: patient  has a normal mood and affect.  behavior is normal.   Nursing note and vitals reviewed.              Assessment/Plan:       1. Pharyngitis, unspecified etiology   PCR negative for COVID, influenza A/B, RSV, strep throat.    Possibly secondary to postnasal drip.   She is already using       - benzonatate (TESSALON) 200 MG capsule; Take 1 Capsule by mouth 3 times a day as needed for Cough.  Dispense: 45 Capsule; Refill: 0  - methylPREDNISolone (MEDROL DOSEPAK) 4 MG Tablet Therapy Pack; Follow schedule on package instructions.  Dispense: 21 Tablet; Refill: 0  - Referral back to PCP    2. Diarrhea, unspecified type  ***  - C DIFFICILE TOXINS A+B, EIA  - CULTURE STOOL; Future  - SALMONELLA/SHIGELLA SCREEN  - E COLI SHIGA TOXIN EIA

## 2024-04-30 LAB
BACTERIA STL CULT: NORMAL
E COLI SXT1+2 STL IA: NORMAL
E COLI SXT1+2 STL IA: NORMAL
SIGNIFICANT IND 70042: NORMAL
SIGNIFICANT IND 70042: NORMAL
SITE SITE: NORMAL
SITE SITE: NORMAL
SOURCE SOURCE: NORMAL
SOURCE SOURCE: NORMAL

## 2024-04-30 NOTE — RESULT ENCOUNTER NOTE
Please call pt:      Stool culture negative for giardia and cryptosporidium.       Follow up in one week if diarrhea not improved, or sooner if symptoms worsen.

## 2024-05-02 LAB
BACTERIA STL CULT: NORMAL
E COLI SXT1+2 STL IA: NORMAL
SIGNIFICANT IND 70042: NORMAL
SITE SITE: NORMAL
SOURCE SOURCE: NORMAL

## 2024-05-08 ENCOUNTER — OFFICE VISIT (OUTPATIENT)
Dept: MEDICAL GROUP | Facility: MEDICAL CENTER | Age: 29
End: 2024-05-08
Attending: FAMILY MEDICINE
Payer: MEDICAID

## 2024-05-08 VITALS
HEART RATE: 85 BPM | WEIGHT: 203.2 LBS | SYSTOLIC BLOOD PRESSURE: 106 MMHG | RESPIRATION RATE: 16 BRPM | HEIGHT: 67 IN | DIASTOLIC BLOOD PRESSURE: 68 MMHG | OXYGEN SATURATION: 95 % | BODY MASS INDEX: 31.89 KG/M2 | TEMPERATURE: 96.7 F

## 2024-05-08 DIAGNOSIS — Z86.59 HISTORY OF DEPRESSION: ICD-10-CM

## 2024-05-08 DIAGNOSIS — M54.50 CHRONIC LOW BACK PAIN, UNSPECIFIED BACK PAIN LATERALITY, UNSPECIFIED WHETHER SCIATICA PRESENT: ICD-10-CM

## 2024-05-08 DIAGNOSIS — Z13.228 SCREENING FOR ENDOCRINE, NUTRITIONAL, METABOLIC AND IMMUNITY DISORDER: ICD-10-CM

## 2024-05-08 DIAGNOSIS — Z13.21 SCREENING FOR ENDOCRINE, NUTRITIONAL, METABOLIC AND IMMUNITY DISORDER: ICD-10-CM

## 2024-05-08 DIAGNOSIS — J40 BRONCHITIS: ICD-10-CM

## 2024-05-08 DIAGNOSIS — G89.29 CHRONIC LOW BACK PAIN, UNSPECIFIED BACK PAIN LATERALITY, UNSPECIFIED WHETHER SCIATICA PRESENT: ICD-10-CM

## 2024-05-08 DIAGNOSIS — J02.9 PHARYNGITIS, UNSPECIFIED ETIOLOGY: ICD-10-CM

## 2024-05-08 DIAGNOSIS — Z30.40 ENCOUNTER FOR SURVEILLANCE OF CONTRACEPTIVES: ICD-10-CM

## 2024-05-08 DIAGNOSIS — J45.40 MODERATE PERSISTENT ASTHMA WITHOUT COMPLICATION: ICD-10-CM

## 2024-05-08 DIAGNOSIS — N87.1 MODERATE CERVICAL DYSPLASIA: ICD-10-CM

## 2024-05-08 DIAGNOSIS — Z13.29 SCREENING FOR ENDOCRINE, NUTRITIONAL, METABOLIC AND IMMUNITY DISORDER: ICD-10-CM

## 2024-05-08 DIAGNOSIS — Z23 NEED FOR VACCINATION: ICD-10-CM

## 2024-05-08 DIAGNOSIS — Z30.018 ENCOUNTER FOR INITIAL PRESCRIPTION OF OTHER CONTRACEPTIVES: ICD-10-CM

## 2024-05-08 DIAGNOSIS — Z13.0 SCREENING FOR ENDOCRINE, NUTRITIONAL, METABOLIC AND IMMUNITY DISORDER: ICD-10-CM

## 2024-05-08 PROBLEM — J45.909 ASTHMA: Status: ACTIVE | Noted: 2024-05-08

## 2024-05-08 PROBLEM — E78.00 HYPERCHOLESTEROLEMIA: Status: ACTIVE | Noted: 2023-08-12

## 2024-05-08 LAB
POCT INT CON NEG: NEGATIVE
POCT INT CON POS: POSITIVE
POCT URINE PREGNANCY TEST: NEGATIVE

## 2024-05-08 PROCEDURE — 3078F DIAST BP <80 MM HG: CPT

## 2024-05-08 PROCEDURE — 99214 OFFICE O/P EST MOD 30 MIN: CPT

## 2024-05-08 PROCEDURE — 3074F SYST BP LT 130 MM HG: CPT

## 2024-05-08 RX ORDER — BENZONATATE 200 MG/1
200 CAPSULE ORAL 3 TIMES DAILY PRN
Qty: 45 CAPSULE | Refills: 0 | Status: SHIPPED | OUTPATIENT
Start: 2024-05-08 | End: 2024-05-29 | Stop reason: SDUPTHER

## 2024-05-08 RX ORDER — ALBUTEROL SULFATE 90 UG/1
2 AEROSOL, METERED RESPIRATORY (INHALATION) EVERY 6 HOURS PRN
Qty: 8.5 G | Refills: 4 | Status: SHIPPED | OUTPATIENT
Start: 2024-05-08 | End: 2024-05-29

## 2024-05-08 RX ORDER — MONTELUKAST SODIUM 10 MG/1
10 TABLET ORAL DAILY
Qty: 30 TABLET | Refills: 2 | Status: SHIPPED | OUTPATIENT
Start: 2024-05-08 | End: 2024-05-31 | Stop reason: SDUPTHER

## 2024-05-08 RX ORDER — METHYLPREDNISOLONE 4 MG/1
TABLET ORAL
Qty: 21 TABLET | Refills: 0 | Status: SHIPPED | OUTPATIENT
Start: 2024-05-08

## 2024-05-08 RX ORDER — NORELGESTROMIN AND ETHINYL ESTRADIOL 35; 150 UG/MG; UG/MG
1 PATCH TRANSDERMAL
Qty: 12 PATCH | Refills: 4 | Status: SHIPPED | OUTPATIENT
Start: 2024-05-08

## 2024-05-08 RX ORDER — AZITHROMYCIN 250 MG/1
TABLET, FILM COATED ORAL
Qty: 6 TABLET | Refills: 0 | Status: SHIPPED | OUTPATIENT
Start: 2024-05-08 | End: 2024-05-13

## 2024-05-08 ASSESSMENT — PATIENT HEALTH QUESTIONNAIRE - PHQ9
CLINICAL INTERPRETATION OF PHQ2 SCORE: 6
5. POOR APPETITE OR OVEREATING: 3 - NEARLY EVERY DAY
SUM OF ALL RESPONSES TO PHQ QUESTIONS 1-9: 25

## 2024-05-08 NOTE — PROGRESS NOTES
Verbal consent was acquired by the patient to use [employer] ambient listening note generation during this visit     Subjective:     CC:  Diagnoses of Bronchitis, Pharyngitis, unspecified etiology, Moderate cervical dysplasia, Screening for endocrine, nutritional, metabolic and immunity disorder, Need for vaccination, History of depression, Chronic low back pain, unspecified back pain laterality, unspecified whether sciatica present, Encounter for surveillance of contraceptives, Encounter for initial prescription of other contraceptives, and Moderate persistent asthma without complication were pertinent to this visit.    HISTORY OF THE PRESENT ILLNESS: Patient is a 29 y.o. female.     History of Present Illness  The patient is a 29-year-old female who goes by the pronoun she and her.    The patient sought medical attention at [facility] approximately one week ago due to a persistent cough and diarrhea . A stool sample was collected, however, the results are pending. A strep and COVID-19 test were conducted, both of which yielded negative results. Despite taking Tessalon Perles for cough relief, the cough has not completely resolved. Over the past two weeks, she has noticed a change in her taste perception, persistent rhinorrhea, and expectorating phlegm. The phlegm varies in color from clear to light yellow, and occasionally resembling small lumps. She also reports nasal congestion, frequent nose blowing, headaches, and sore throat. Although she has regained some of her voice, it remains raspy. She contracted COVID-19 in 01/2024, and the lingering cough has persisted and occasionally worsens. The albuterol inhaler has not been used, but Tessalon Perles have been effective in managing her cough. She reports a sensation of fluid in her throat during coughing episodes. She occasionally experiences dyspnea and speech difficulties due to the cough. She has a sensitive gag reflex and a tendency to vomit. Her cough  is impacting her appetite and sleep. She experienced a positive COVID-19 test on 12/29/2023.    The patient experiences occasional diarrhea, which is alleviated by Pepto-Bismol and other medications. She resorts to Pepto when she consumes lactose-containing cheese and is considering incorporating lactose-free cheese into her diet.      Supplemental Information  She has had chickenpox in her life. She is not currently sexually active. 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 She had slight elevation in her white blood cell count a couple of times when she came from [place], so she was sent to an oncologist, but she never got those results. She has chronic pain in her lower back and neck. She was going to PT in [place] for muscle deterioration, which was permanent for 3 years. PT was helping a lot. With the mix of PT and psychology, she was feeling really good. She would like to get back into PT to build her muscles, strength, and fix her mental state. She would like a refill of her birth control patches. Her last Pap smear was on 10/26/2024 in [place]. She had a colposcopy.   The patient denies smoking or vaping. She rarely drinks alcohol. She takes edibles for depression, anxiety, and panic attacks, which does help.   She has a family history of diabetes in her mother and all her family.   She is allergic to PENICILLINS, which causes her throat to close up and break out. CODEINE and SULFA DRUGS.    Health Maintenance: reviewed and completed per patient preference.     ROS:   - CONSTITUTIONAL: Denies weight loss, fever and chills.  - HEENT: Denies changes in vision and hearing. Endorses congestion.   - RESPIRATORY: Denies SOB. Endorses productive cough with yellow mucus.  - CV: Denies palpitations and CP.  - GI: Denies abdominal pain, nausea, vomiting and diarrhea.  - : Denies dysuria and urinary frequency.  - MSK: Denies myalgia and joint pain.  - SKIN:  "Denies rash and pruritus.  - NEUROLOGICAL: Denies headache and syncope.  - PSYCHIATRIC: Denies recent changes in mood. Denies anxiety and depression.           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   Tobacco Use    Smoking status: Never    Smokeless tobacco: Never   Vaping Use    Vaping Use: Never used   Substance and Sexual Activity    Alcohol use: Yes     Comment: occ    Drug use: Yes     Types: Marijuana     Comment: gummies    Sexual activity: Not Currently     Partners: Male   Other Topics Concern    Not on file   Social History Narrative    Lives with mother, family     Social Determinants of Health     Financial Resource Strain: Not on file   Food Insecurity: Not on file   Transportation Needs: Not on file   Physical Activity: Not on file   Stress: Not on file   Social Connections: Not on file   Intimate Partner Violence: Not on file   Housing Stability: Not on file      Allergies   Allergen Reactions    Cillins [Penicillins]     Codeine     Sulfa Drugs             Current Outpatient Medications:     methylPREDNISolone, Follow schedule on package instructions.    azithromycin, 2 tabs by mouth day 1, 1 tab by mouth days 2-5    montelukast, 10 mg, Oral, DAILY    benzonatate, 200 mg, Oral, TID PRN    albuterol, 2 Puff, Inhalation, Q6HRS PRN    norelgestromin-ethinyl estradiol, 1 Patch, Transdermal, Q7 DAYS    Ibuprofen, Take  by mouth., Taking    fluticasone, 1 Spray, Nasal, DAILY, Taking   Objective:     Exam: /68 (BP Location: Left arm, Patient Position: Sitting, BP Cuff Size: Adult)   Pulse 85   Temp 35.9 °C (96.7 °F) (Temporal)   Resp 16   Ht 1.702 m (5' 7\")   Wt 92.2 kg (203 lb 3.2 oz)   SpO2 95%  Body mass index is 31.83 kg/m².    Physical " Exam:  Constitutional: Alert, no distress, well-groomed.  Skin: Warm, dry, good turgor, no rashes in visible areas.  Eye: Equal, round and reactive, conjunctiva clear, lids normal.  ENMT: Lips without lesions, good dentition, moist mucous membranes.  Neck: Trachea midline, no masses, no thyromegaly.  Respiratory: Unlabored respiratory effort. LCTA. Barking, dry cough.   Abd: soft, non tender, non distended, normal BS  MSK: Normal gait, moves all extremities.  Neuro: Grossly non-focal.   Psych: Alert and oriented x3, normal affect and mood.    Labs: Reviewed    Assessment & Plan:   29 y.o. female with the following -    1. Bronchitis  2. Pharyngitis, unspecified etiology  Acute on chronic, not improving. Upon auscultation, there are no discernible areas of quietness or density in the lungs, thus negating the need for a chest x-ray. The cough does not appear to be indicative of pneumonia, however, the sound of the lungs appears irritated. A prescription for a Medrol Dosepak, azithromycin, and montelukast has been issued. Long discussion with patient about etiology and treatment. Bronchitis- simple, community acquired, may have started as viral (Probably respiratory syncytial, parainfluenza, influenza, or adenovirus), but now evidence of acute purulent bronchitis with resultant bronchial edema and mucus formation.  I will give Zithromax for five days for possible Mycoplamsa. ER/return precautions reviewed. Patient verbalized understanding and agreed with plan.  - methylPREDNISolone (MEDROL DOSEPAK) 4 MG Tablet Therapy Pack; Follow schedule on package instructions.  Dispense: 21 Tablet; Refill: 0  - azithromycin (ZITHROMAX) 250 MG Tab; 2 tabs by mouth day 1, 1 tab by mouth days 2-5  Dispense: 6 Tablet; Refill: 0  - benzonatate (TESSALON) 200 MG capsule; Take 1 Capsule by mouth 3 times a day as needed for Cough.  Dispense: 45 Capsule; Refill: 0    3. Moderate cervical dysplasia  - Referral to OB/Gyn    4. Screening for  endocrine, nutritional, metabolic and immunity disorder  - HIV AG/AB COMBO ASSAY SCREENING; Future  - HEP C VIRUS ANTIBODY; Future  - Comp Metabolic Panel; Future  - CBC WITHOUT DIFFERENTIAL; Future  - HEMOGLOBIN A1C; Future  - Lipid Profile; Future  - TSH WITH REFLEX TO FT4; Future  - VITAMIN D,25 HYDROXY (DEFICIENCY); Future  - HEP B SURFACE AB; Future    5. Need for vaccination  Patient requests vaccination today. Vaccine given. Patient tolerated well. Follow up precautions given.   - Tdap Vaccine =>6YO IM  - Pneumococcal Conjugate Vaccine 20-Valent (6 wks+)    6. History of depression  Chronic, stable. Her symptoms are consistent with above diagnosis.  Patient denies any manic episodes.  Discussed mood with patient in detail allowing patient to reveal emotions. Does not appear to be safety risk. No weapons in the home. Has strong social support. Based on history and past treatment, recommended the following:  - Counseling for stress mgmt techniques -  Behavioral Med referral   - Informed the patient of [facility], Crisis line and the resources that are available  - ER for SI/SA/HI  - Pt agrees with this plan  - Referral to Psychology    7. Chronic low back pain, unspecified back pain laterality, unspecified whether sciatica present  - Referral to Physical Therapy    8. Encounter for surveillance of contraceptives  - norelgestromin-ethinyl estradiol (ORTHO EVRA) 150-35 MCG/24HR patch; Place 1 Patch on the skin every 7 days.  Dispense: 12 Patch; Refill: 4    9. Encounter for initial prescription of other contraceptives  Acute issue. POC pregnancy test negative. I discussed with her that it does not protect against STDs and she must use a condom. I counseled her on how to begin the medicine and I also informed her that when she starts the medicine that she should use a backup method for the first month. I also counseled the patient that this is not 100% effective in preventing pregnancy.  - POCT PREGNANCY    10.  Moderate persistent asthma without complication  Chronic, stable. Patient is not currently using any maintenance therapy and albuterol as a rescue treatment. Her symptoms are present monthly and are exacerbated by environmental triggers and illness. It was recommended that she avoid sick people, allergens such as dander and mold, and avoid smoke. We discussed signs and symptoms that warrant further evaluation.    - montelukast (SINGULAIR) 10 MG Tab; Take 1 Tablet by mouth every day.  Dispense: 30 Tablet; Refill: 2  - albuterol 108 (90 Base) MCG/ACT Aero Soln inhalation aerosol; Inhale 2 Puffs every 6 hours as needed for Shortness of Breath.  Dispense: 8.5 g; Refill: 4      Return if symptoms worsen or fail to improve.    Please note that this dictation was created using voice recognition software. I have made every reasonable attempt to correct obvious errors, but I expect that there are errors of grammar and possibly content that I did not discover before finalizing the note.

## 2024-05-09 ENCOUNTER — HOSPITAL ENCOUNTER (OUTPATIENT)
Dept: LAB | Facility: MEDICAL CENTER | Age: 29
End: 2024-05-09
Payer: MEDICAID

## 2024-05-09 DIAGNOSIS — Z13.29 SCREENING FOR ENDOCRINE, NUTRITIONAL, METABOLIC AND IMMUNITY DISORDER: ICD-10-CM

## 2024-05-09 DIAGNOSIS — Z13.0 SCREENING FOR ENDOCRINE, NUTRITIONAL, METABOLIC AND IMMUNITY DISORDER: ICD-10-CM

## 2024-05-09 DIAGNOSIS — J02.9 PHARYNGITIS, UNSPECIFIED ETIOLOGY: ICD-10-CM

## 2024-05-09 DIAGNOSIS — Z13.228 SCREENING FOR ENDOCRINE, NUTRITIONAL, METABOLIC AND IMMUNITY DISORDER: ICD-10-CM

## 2024-05-09 DIAGNOSIS — Z13.21 SCREENING FOR ENDOCRINE, NUTRITIONAL, METABOLIC AND IMMUNITY DISORDER: ICD-10-CM

## 2024-05-09 LAB
25(OH)D3 SERPL-MCNC: 6 NG/ML (ref 30–100)
ALBUMIN SERPL BCP-MCNC: 4.2 G/DL (ref 3.2–4.9)
ALBUMIN/GLOB SERPL: 1.4 G/DL
ALP SERPL-CCNC: 83 U/L (ref 30–99)
ALT SERPL-CCNC: 15 U/L (ref 2–50)
ANION GAP SERPL CALC-SCNC: 12 MMOL/L (ref 7–16)
AST SERPL-CCNC: 14 U/L (ref 12–45)
BILIRUB SERPL-MCNC: 0.6 MG/DL (ref 0.1–1.5)
BUN SERPL-MCNC: 14 MG/DL (ref 8–22)
CALCIUM ALBUM COR SERPL-MCNC: 8.7 MG/DL (ref 8.5–10.5)
CALCIUM SERPL-MCNC: 8.9 MG/DL (ref 8.5–10.5)
CHLORIDE SERPL-SCNC: 104 MMOL/L (ref 96–112)
CHOLEST SERPL-MCNC: 218 MG/DL (ref 100–199)
CO2 SERPL-SCNC: 23 MMOL/L (ref 20–33)
CREAT SERPL-MCNC: 0.99 MG/DL (ref 0.5–1.4)
ERYTHROCYTE [DISTWIDTH] IN BLOOD BY AUTOMATED COUNT: 43.4 FL (ref 35.9–50)
EST. AVERAGE GLUCOSE BLD GHB EST-MCNC: 114 MG/DL
FASTING STATUS PATIENT QL REPORTED: NORMAL
GFR SERPLBLD CREATININE-BSD FMLA CKD-EPI: 78 ML/MIN/1.73 M 2
GLOBULIN SER CALC-MCNC: 2.9 G/DL (ref 1.9–3.5)
GLUCOSE SERPL-MCNC: 93 MG/DL (ref 65–99)
HBA1C MFR BLD: 5.6 % (ref 4–5.6)
HBV SURFACE AB SERPL IA-ACNC: 229 MIU/ML (ref 0–10)
HCT VFR BLD AUTO: 48.4 % (ref 37–47)
HCV AB SER QL: NORMAL
HDLC SERPL-MCNC: 33 MG/DL
HGB BLD-MCNC: 16.1 G/DL (ref 12–16)
HIV 1+2 AB+HIV1 P24 AG SERPL QL IA: NORMAL
LDLC SERPL CALC-MCNC: 132 MG/DL
MCH RBC QN AUTO: 30.6 PG (ref 27–33)
MCHC RBC AUTO-ENTMCNC: 33.3 G/DL (ref 32.2–35.5)
MCV RBC AUTO: 91.8 FL (ref 81.4–97.8)
PLATELET # BLD AUTO: 624 K/UL (ref 164–446)
PMV BLD AUTO: 10.6 FL (ref 9–12.9)
POTASSIUM SERPL-SCNC: 3.8 MMOL/L (ref 3.6–5.5)
PROT SERPL-MCNC: 7.1 G/DL (ref 6–8.2)
RBC # BLD AUTO: 5.27 M/UL (ref 4.2–5.4)
SODIUM SERPL-SCNC: 139 MMOL/L (ref 135–145)
TRIGL SERPL-MCNC: 264 MG/DL (ref 0–149)
TSH SERPL DL<=0.005 MIU/L-ACNC: 1.01 UIU/ML (ref 0.38–5.33)
WBC # BLD AUTO: 17 K/UL (ref 4.8–10.8)

## 2024-05-29 ENCOUNTER — OFFICE VISIT (OUTPATIENT)
Dept: MEDICAL GROUP | Facility: MEDICAL CENTER | Age: 29
End: 2024-05-29
Payer: MEDICAID

## 2024-05-29 VITALS
RESPIRATION RATE: 16 BRPM | WEIGHT: 209.1 LBS | DIASTOLIC BLOOD PRESSURE: 60 MMHG | OXYGEN SATURATION: 96 % | TEMPERATURE: 97.7 F | SYSTOLIC BLOOD PRESSURE: 98 MMHG | BODY MASS INDEX: 32.82 KG/M2 | HEIGHT: 67 IN | HEART RATE: 71 BPM

## 2024-05-29 DIAGNOSIS — J40 BRONCHITIS: ICD-10-CM

## 2024-05-29 DIAGNOSIS — J02.9 PHARYNGITIS, UNSPECIFIED ETIOLOGY: ICD-10-CM

## 2024-05-29 DIAGNOSIS — Z86.59 HISTORY OF DEPRESSION: ICD-10-CM

## 2024-05-29 DIAGNOSIS — D75.1 POLYCYTHEMIA: ICD-10-CM

## 2024-05-29 DIAGNOSIS — D75.839 THROMBOCYTOSIS: ICD-10-CM

## 2024-05-29 DIAGNOSIS — D72.829 LEUKOCYTOSIS, UNSPECIFIED TYPE: ICD-10-CM

## 2024-05-29 DIAGNOSIS — R05.2 SUBACUTE COUGH: ICD-10-CM

## 2024-05-29 DIAGNOSIS — E55.9 VITAMIN D DEFICIENCY: ICD-10-CM

## 2024-05-29 RX ORDER — FLUTICASONE PROPIONATE 110 UG/1
2 AEROSOL, METERED RESPIRATORY (INHALATION) 2 TIMES DAILY
Qty: 1 EACH | Refills: 2 | Status: SHIPPED | OUTPATIENT
Start: 2024-05-29

## 2024-05-29 RX ORDER — ERGOCALCIFEROL 1.25 MG/1
50000 CAPSULE ORAL
Qty: 12 CAPSULE | Refills: 3 | Status: SHIPPED | OUTPATIENT
Start: 2024-05-29

## 2024-05-29 RX ORDER — BENZONATATE 200 MG/1
200 CAPSULE ORAL 3 TIMES DAILY PRN
Qty: 45 CAPSULE | Refills: 0 | Status: SHIPPED | OUTPATIENT
Start: 2024-05-29

## 2024-05-29 ASSESSMENT — FIBROSIS 4 INDEX: FIB4 SCORE: 0.18

## 2024-05-29 NOTE — LETTER
[facility]  DANIA Harley.  [address] [place] [address]  Fax: [phone]   Authorization for Release/Disclosure of   Protected Health Information   Name: KIRSTY GARCÍA : 1995 SSN: [id number]   Address: [facility] [address]   [place] [address] Phone:    [phone] (home)    I authorize the entity listed below to release/disclose the PHI below to:   [facility]/RAJIV Harley and RAJIV Harley   Provider or Entity Name:  [facility]      Address   City, State, Zip   Phone:      Fax:     Reason for request: continuity of care   Information to be released:    [  ] LAST COLONOSCOPY,  including any PATH REPORT and follow-up  [  ] LAST FIT/COLOGUARD RESULT [  ] LAST DEXA  [  ] LAST MAMMOGRAM  [  ] LAST PAP  [  ] LAST LABS [  ] RETINA EXAM REPORT  [  ] IMMUNIZATION RECORDS  [  ] Release all info      [  ] Check here and initial the line next to each item to release ALL health information INCLUDING  _____ Care and treatment for drug and / or alcohol abuse  _____ HIV testing, infection status, or AIDS  _____ Genetic Testing    DATES OF SERVICE OR TIME PERIOD TO BE DISCLOSED: _____________  I understand and acknowledge that:  * This Authorization may be revoked at any time by you in writing, except if your health information has already been used or disclosed.  * Your health information that will be used or disclosed as a result of you signing this authorization could be re-disclosed by the recipient. If this occurs, your re-disclosed health information may no longer be protected by State or Federal laws.  * You may refuse to sign this Authorization. Your refusal will not affect your ability to obtain treatment.  * This Authorization becomes effective upon signing and will  on (date) __________.      If no date is indicated, this Authorization will  one (1) year from the signature date.    Name: Kirsty García  Signature: Date:   2024     PLEASE  FAX REQUESTED RECORDS BACK TO: [phone]

## 2024-05-30 LAB
FLUAV RNA SPEC QL NAA+PROBE: NEGATIVE
FLUBV RNA SPEC QL NAA+PROBE: NEGATIVE
RSV RNA SPEC QL NAA+PROBE: NEGATIVE
S PYO DNA SPEC NAA+PROBE: NOT DETECTED
SARS-COV-2 RNA RESP QL NAA+PROBE: NEGATIVE

## 2024-05-30 NOTE — ADDENDUM NOTE
Addended by: KIMBERLY STEIN on: 5/30/2024 01:01 PM     Modules accepted: Orders, Level of Service

## 2024-05-30 NOTE — PROGRESS NOTES
"Subjective:     CC: ***    HPI:   Kirsty presents today with    No problem-specific Assessment & Plan notes found for this encounter.         ROS:  - CONSTITUTIONAL: Denies weight loss, fever and chills.  - HEENT: Denies changes in vision and hearing.  - RESPIRATORY: Denies SOB and cough.  - CV: Denies palpitations and CP.  - GI: Denies abdominal pain, nausea, vomiting and diarrhea.  - : Denies dysuria and urinary frequency.  - MSK: Denies myalgia and joint pain.  - SKIN: Denies rash and pruritus.  - NEUROLOGICAL: Denies headache and syncope.  - PSYCHIATRIC: Denies recent changes in mood. Denies anxiety and depression.    Please see HPI for additional ROS.       Objective:     Exam:  BP 98/60 (BP Location: Left arm, Patient Position: Sitting, BP Cuff Size: Adult)   Pulse 71   Temp 36.5 °C (97.7 °F) (Temporal)   Resp 16   Ht 1.702 m (5' 7\")   Wt 94.8 kg (209 lb 1.6 oz)   SpO2 96%   BMI 32.75 kg/m²  Body mass index is 32.75 kg/m².    Physical Exam:  Constitutional: Alert, no distress, well-groomed.  Skin: Warm, dry, good turgor, no rashes in visible areas.  Eye: Equal, round and reactive, conjunctiva clear, lids normal.  ENMT: Lips without lesions, good dentition, moist mucous membranes.  Neck: Trachea midline, no masses, no thyromegaly.  Respiratory: Unlabored respiratory effort, no cough.  Abd: soft, non tender, non distended, normal BS  MSK: Normal gait, moves all extremities.  Neuro: Grossly non-focal.   Psych: Alert and oriented x3, normal affect and mood.    Labs: Reviewed    Assessment & Plan:     29 y.o. adult with the following -     1. Subacute cough  ***    2. History of depression  ***            No follow-ups on file.    Please note that this dictation was created using voice recognition software. I have made every reasonable attempt to correct obvious errors, but I expect that there are errors of grammar and possibly content that I did not discover before finalizing the note.        "

## 2024-05-30 NOTE — ASSESSMENT & PLAN NOTE
Patient reports that she has had a cough that does not seen to be improving. She is concerned that due to the production of yellow sputum she may have pneumonia. She is unsure if she has had any fevers but denies chest pain or dyspnea at rest. She reports not having her SOL inhaler due to the cost of albuterol which may be contributing to her cough.

## 2024-05-30 NOTE — PROGRESS NOTES
Unable to connect via Zoom for her virtual appointment.  Attempted to call the patient multiple times with no answer.  Visit was canceled.

## 2024-05-30 NOTE — TELEPHONE ENCOUNTER
Received request via: Patient    Was the patient seen in the last year in this department? Yes    Does the patient have an active prescription (recently filled or refills available) for medication(s) requested? No    Pharmacy Name: [facility]    Does the patient have longterm Plus and need 100 day supply (blood pressure, diabetes and cholesterol meds only)? Patient does not have SCP

## 2024-05-30 NOTE — PROGRESS NOTES
"Subjective:     CC: Cough    HPI:   Kirsty presents today with    Subacute cough  Patient reports that she has had a cough that does not seen to be improving. She is concerned that due to the production of yellow sputum she may have pneumonia. She is unsure if she has had any fevers but denies chest pain or dyspnea at rest. She reports not having her SOL inhaler due to the cost of albuterol which may be contributing to her cough.        ROS:  - CONSTITUTIONAL: Denies weight loss, fever and chills.  - HEENT: Denies changes in vision and hearing.  - RESPIRATORY: Endorses cough.  - CV: Denies palpitations and CP.  - GI: Denies abdominal pain, nausea, vomiting and diarrhea.  - : Denies dysuria and urinary frequency.  - MSK: Denies myalgia and joint pain.  - SKIN: Denies rash and pruritus.  - NEUROLOGICAL: Denies headache and syncope.  - PSYCHIATRIC: Denies recent changes in mood. Denies anxiety and depression.    Please see HPI for additional ROS.       Objective:     Exam:  BP 98/60 (BP Location: Left arm, Patient Position: Sitting, BP Cuff Size: Adult)   Pulse 71   Temp 36.5 °C (97.7 °F) (Temporal)   Resp 16   Ht 1.702 m (5' 7\")   Wt 94.8 kg (209 lb 1.6 oz)   SpO2 96%   BMI 32.75 kg/m²  Body mass index is 32.75 kg/m².    Physical Exam:  Constitutional: Alert, no distress, well-groomed.  Skin: Warm, dry, good turgor, no rashes in visible areas.  Eye: Equal, round and reactive, conjunctiva clear, lids normal.  ENMT: Lips without lesions, good dentition, moist mucous membranes.  Neck: Trachea midline, no masses, no thyromegaly.  Respiratory: Unlabored respiratory effort. Dry tight cough.  Abd: soft, non tender, non distended, normal BS  MSK: Normal gait, moves all extremities.  Neuro: Grossly non-focal.   Psych: Alert and oriented x3, normal affect and mood.    Labs: Reviewed    Assessment & Plan:     29 y.o. adult with the following -     1. Subacute cough  Acute issue. No respiratory distress, evidence of " moderate to severe range dehydration, or worrisome vital signs. Discussed most likely viral etiology for symptoms secondary to history and physical examination. We will complete chest imaging to r/o pneumonia. She reports not having her SOL inhaler due to the cost of albuterol. We will send over flovent in hopes that she will have better coverage.  POCT negative.   - Will treat symptomatically for now while awaiting POC tests with:  - Tylenol or Motrin q6-8 hrs, may alternate q4 hours  - Mucinex for congestion  - Chloraseptic for sore throat  - Warm salt water gargles  - Saline nasal rinse  to aid in improving nasal congestion.  - Zinc lozenges to aid in overall symptom improvement and duration of illness  - Discussed adequate rest, proper hygiene and respiratory precautions, and hydration with water and electrolyte drink.   - Encouraged staying up to date on appropriate vaccines  - Pt. to follow up for worsening symptoms in 3-5 days or persistence in 2 weeks  - Clinic vs. ER for respiratory distress or inability to hydrate per our discussion   - Pt. told to expect cough to resolve slowly over one month   - Pt understands and verbalizes agreement.  - POCT Cepheid Group A Strep - PCR  - POCT Cepheid CoV-2, Flu A/B, RSV - PCR  - fluticasone (FLOVENT HFA) 110 MCG/ACT Aerosol; Inhale 2 Puffs 2 times a day.  Dispense: 1 Each; Refill: 2  - DX-CHEST-2 VIEWS; Future    2. Polycythemia  3. Leukocytosis, unspecified type  4. Thrombocytosis  Chronic issue, patient reports that was seen by a Heme/Onc specialist in [place] but does not remember the results. We will recheck a CBC with peripheral smear, EPO, and iron studies. If those results come back abnormal we will refer to hematology for further evaluation. She denies any Vasomotor symptoms, constitutional symptom, or unusual thrombotic events.   - IRON/TOTAL IRON BIND; Future  - FERRITIN; Future  - CBC WITH DIFFERENTIAL; Future  - ERYTHROPOIETIN; Future    5. History of  depression  Chronic, His symptoms are consistent with above diagnosis.  Patient denies any manic episodes.  Discussed mood with patient in detail allowing patient to reveal emotions. Does not appear to be safety risk. No weapons in the home. Has strong social support. Based on history and past treatment, recommended the following:  - Counseling for stress mgmt techniques   - Informed the patient of [employer], Crisis line and the resources that are available  - Pt to call in one week to report intolerance of meds and any changes in mood / Sx  - ER for SI/SA/HI  - Pt agrees with this plan  - Referral to Psychology    6. Vitamin D deficiency  Acute issue. We will follow up   - ergocalciferol (DRISDOL) 72990 UNIT capsule; Take 1 Capsule by mouth every 7 days.  Dispense: 12 Capsule; Refill: 3        Return if symptoms worsen or fail to improve.    Please note that this dictation was created using voice recognition software. I have made every reasonable attempt to correct obvious errors, but I expect that there are errors of grammar and possibly content that I did not discover before finalizing the note.

## 2024-05-31 DIAGNOSIS — J02.9 PHARYNGITIS, UNSPECIFIED ETIOLOGY: ICD-10-CM

## 2024-05-31 DIAGNOSIS — J45.40 MODERATE PERSISTENT ASTHMA WITHOUT COMPLICATION: ICD-10-CM

## 2024-05-31 RX ORDER — MONTELUKAST SODIUM 10 MG/1
10 TABLET ORAL DAILY
Qty: 30 TABLET | Refills: 2 | Status: SHIPPED | OUTPATIENT
Start: 2024-05-31

## 2024-05-31 NOTE — TELEPHONE ENCOUNTER
Received request via: Patient    Was the patient seen in the last year in this department? Yes    Does the patient have an active prescription (recently filled or refills available) for medication(s) requested? No    Pharmacy Name: [facility]    Does the patient have [facility] Plus and need 100 day supply (blood pressure, diabetes and cholesterol meds only)? Patient does not have SCP    Future Appointments         Provider Department Center    6/10/2024 4:00 PM (Arrive by 3:45 PM) [facility]    6/21/2024 2:30 PM (Arrive by 2:00 PM) [facility] 8 [facility] - Ultrasound [facility]  Arrive at: Ultrasound Imaging Deparment Check- in [facility]

## 2024-06-10 ENCOUNTER — HOSPITAL ENCOUNTER (OUTPATIENT)
Dept: RADIOLOGY | Facility: MEDICAL CENTER | Age: 29
End: 2024-06-10
Payer: MEDICAID

## 2024-06-10 DIAGNOSIS — R05.2 SUBACUTE COUGH: ICD-10-CM

## 2024-06-10 PROCEDURE — 71046 X-RAY EXAM CHEST 2 VIEWS: CPT

## 2024-06-17 ENCOUNTER — HOSPITAL ENCOUNTER (OUTPATIENT)
Dept: LAB | Facility: MEDICAL CENTER | Age: 29
End: 2024-06-17
Payer: MEDICAID

## 2024-06-17 DIAGNOSIS — D75.839 THROMBOCYTOSIS: ICD-10-CM

## 2024-06-17 DIAGNOSIS — D75.1 POLYCYTHEMIA: ICD-10-CM

## 2024-06-17 LAB
BASOPHILS # BLD AUTO: 0.6 % (ref 0–1.8)
BASOPHILS # BLD: 0.09 K/UL (ref 0–0.12)
EOSINOPHIL # BLD AUTO: 0.17 K/UL (ref 0–0.51)
EOSINOPHIL NFR BLD: 1.1 % (ref 0–6.9)
ERYTHROCYTE [DISTWIDTH] IN BLOOD BY AUTOMATED COUNT: 43.4 FL (ref 35.9–50)
FERRITIN SERPL-MCNC: 99.3 NG/ML (ref 10–291)
HCT VFR BLD AUTO: 44.2 % (ref 37–47)
HGB BLD-MCNC: 14.8 G/DL (ref 12–16)
IMM GRANULOCYTES # BLD AUTO: 0.09 K/UL (ref 0–0.11)
IMM GRANULOCYTES NFR BLD AUTO: 0.6 % (ref 0–0.9)
IRON SATN MFR SERPL: 22 % (ref 15–55)
IRON SERPL-MCNC: 66 UG/DL (ref 40–170)
LYMPHOCYTES # BLD AUTO: 3.02 K/UL (ref 1–4.8)
LYMPHOCYTES NFR BLD: 19.5 % (ref 22–41)
MCH RBC QN AUTO: 30.7 PG (ref 27–33)
MCHC RBC AUTO-ENTMCNC: 33.5 G/DL (ref 32.2–35.5)
MCV RBC AUTO: 91.7 FL (ref 81.4–97.8)
MONOCYTES # BLD AUTO: 0.96 K/UL (ref 0–0.85)
MONOCYTES NFR BLD AUTO: 6.2 % (ref 0–13.4)
NEUTROPHILS # BLD AUTO: 11.14 K/UL (ref 1.82–7.42)
NEUTROPHILS NFR BLD: 72 % (ref 44–72)
NRBC # BLD AUTO: 0 K/UL
NRBC BLD-RTO: 0 /100 WBC (ref 0–0.2)
PLATELET # BLD AUTO: 527 K/UL (ref 164–446)
PMV BLD AUTO: 10.8 FL (ref 9–12.9)
RBC # BLD AUTO: 4.82 M/UL (ref 4.2–5.4)
TIBC SERPL-MCNC: 295 UG/DL (ref 250–450)
UIBC SERPL-MCNC: 229 UG/DL (ref 110–370)
WBC # BLD AUTO: 15.5 K/UL (ref 4.8–10.8)

## 2024-06-17 PROCEDURE — 83550 IRON BINDING TEST: CPT

## 2024-06-17 PROCEDURE — 82728 ASSAY OF FERRITIN: CPT

## 2024-06-17 PROCEDURE — 85025 COMPLETE CBC W/AUTO DIFF WBC: CPT

## 2024-06-17 PROCEDURE — 36415 COLL VENOUS BLD VENIPUNCTURE: CPT

## 2024-06-17 PROCEDURE — 82668 ASSAY OF ERYTHROPOIETIN: CPT

## 2024-06-17 PROCEDURE — 83540 ASSAY OF IRON: CPT

## 2024-06-19 LAB — EPO SERPL-ACNC: 7 MU/ML (ref 4–27)

## 2024-06-21 ENCOUNTER — HOSPITAL ENCOUNTER (OUTPATIENT)
Dept: RADIOLOGY | Facility: MEDICAL CENTER | Age: 29
End: 2024-06-21
Payer: MEDICAID

## 2024-06-26 DIAGNOSIS — D75.839 THROMBOCYTOSIS: ICD-10-CM

## 2024-06-26 DIAGNOSIS — D72.829 LEUKOCYTOSIS, UNSPECIFIED TYPE: ICD-10-CM

## 2024-06-28 DIAGNOSIS — J02.9 PHARYNGITIS, UNSPECIFIED ETIOLOGY: ICD-10-CM

## 2024-06-28 DIAGNOSIS — J40 BRONCHITIS: ICD-10-CM

## 2024-06-28 DIAGNOSIS — J45.40 MODERATE PERSISTENT ASTHMA WITHOUT COMPLICATION: ICD-10-CM

## 2024-07-02 RX ORDER — BENZONATATE 200 MG/1
200 CAPSULE ORAL 3 TIMES DAILY PRN
Qty: 45 CAPSULE | Refills: 0 | Status: SHIPPED | OUTPATIENT
Start: 2024-07-02 | End: 2024-07-09

## 2024-07-02 RX ORDER — MONTELUKAST SODIUM 10 MG/1
10 TABLET ORAL DAILY
Qty: 30 TABLET | Refills: 2 | Status: SHIPPED | OUTPATIENT
Start: 2024-07-02

## 2024-07-09 ENCOUNTER — OFFICE VISIT (OUTPATIENT)
Dept: MEDICAL GROUP | Facility: MEDICAL CENTER | Age: 29
End: 2024-07-09
Payer: MEDICAID

## 2024-07-09 VITALS
BODY MASS INDEX: 32.3 KG/M2 | SYSTOLIC BLOOD PRESSURE: 110 MMHG | WEIGHT: 205.8 LBS | HEIGHT: 67 IN | RESPIRATION RATE: 16 BRPM | DIASTOLIC BLOOD PRESSURE: 70 MMHG | HEART RATE: 79 BPM | TEMPERATURE: 96.8 F | OXYGEN SATURATION: 95 %

## 2024-07-09 DIAGNOSIS — F33.2 SEVERE EPISODE OF RECURRENT MAJOR DEPRESSIVE DISORDER, WITHOUT PSYCHOTIC FEATURES (HCC): ICD-10-CM

## 2024-07-09 DIAGNOSIS — Z13.29 SCREENING FOR ENDOCRINE, NUTRITIONAL, METABOLIC AND IMMUNITY DISORDER: ICD-10-CM

## 2024-07-09 DIAGNOSIS — Z13.0 SCREENING FOR ENDOCRINE, NUTRITIONAL, METABOLIC AND IMMUNITY DISORDER: ICD-10-CM

## 2024-07-09 DIAGNOSIS — R63.0 DECREASED APPETITE: ICD-10-CM

## 2024-07-09 DIAGNOSIS — K21.9 GASTROESOPHAGEAL REFLUX DISEASE WITHOUT ESOPHAGITIS: ICD-10-CM

## 2024-07-09 DIAGNOSIS — Z13.21 SCREENING FOR ENDOCRINE, NUTRITIONAL, METABOLIC AND IMMUNITY DISORDER: ICD-10-CM

## 2024-07-09 DIAGNOSIS — Z13.228 SCREENING FOR ENDOCRINE, NUTRITIONAL, METABOLIC AND IMMUNITY DISORDER: ICD-10-CM

## 2024-07-09 PROBLEM — F32.9 MAJOR DEPRESSIVE DISORDER: Status: ACTIVE | Noted: 2024-07-09

## 2024-07-09 PROCEDURE — 99213 OFFICE O/P EST LOW 20 MIN: CPT

## 2024-07-09 PROCEDURE — 3074F SYST BP LT 130 MM HG: CPT

## 2024-07-09 PROCEDURE — 99214 OFFICE O/P EST MOD 30 MIN: CPT

## 2024-07-09 PROCEDURE — 3078F DIAST BP <80 MM HG: CPT

## 2024-07-09 RX ORDER — OMEPRAZOLE 20 MG/1
20 CAPSULE, DELAYED RELEASE ORAL DAILY
Qty: 30 CAPSULE | Refills: 2 | Status: SHIPPED | OUTPATIENT
Start: 2024-07-09

## 2024-07-09 RX ORDER — DULOXETIN HYDROCHLORIDE 30 MG/1
30 CAPSULE, DELAYED RELEASE ORAL DAILY
Qty: 30 CAPSULE | Refills: 2 | Status: SHIPPED | OUTPATIENT
Start: 2024-07-09

## 2024-07-09 RX ORDER — SUCRALFATE 1 G/1
1 TABLET ORAL
Qty: 90 TABLET | Refills: 0 | Status: SHIPPED | OUTPATIENT
Start: 2024-07-09

## 2024-07-09 ASSESSMENT — FIBROSIS 4 INDEX: FIB4 SCORE: 0.21

## 2024-07-09 ASSESSMENT — PATIENT HEALTH QUESTIONNAIRE - PHQ9
5. POOR APPETITE OR OVEREATING: 3 - NEARLY EVERY DAY
CLINICAL INTERPRETATION OF PHQ2 SCORE: 6
SUM OF ALL RESPONSES TO PHQ QUESTIONS 1-9: 25

## 2024-07-09 ASSESSMENT — ANXIETY QUESTIONNAIRES
1. FEELING NERVOUS, ANXIOUS, OR ON EDGE: NEARLY EVERY DAY
IF YOU CHECKED OFF ANY PROBLEMS ON THIS QUESTIONNAIRE, HOW DIFFICULT HAVE THESE PROBLEMS MADE IT FOR YOU TO DO YOUR WORK, TAKE CARE OF THINGS AT HOME, OR GET ALONG WITH OTHER PEOPLE: VERY DIFFICULT
2. NOT BEING ABLE TO STOP OR CONTROL WORRYING: NEARLY EVERY DAY
6. BECOMING EASILY ANNOYED OR IRRITABLE: NEARLY EVERY DAY
5. BEING SO RESTLESS THAT IT IS HARD TO SIT STILL: MORE THAN HALF THE DAYS
3. WORRYING TOO MUCH ABOUT DIFFERENT THINGS: NEARLY EVERY DAY
GAD7 TOTAL SCORE: 20
4. TROUBLE RELAXING: NEARLY EVERY DAY
7. FEELING AFRAID AS IF SOMETHING AWFUL MIGHT HAPPEN: NEARLY EVERY DAY

## 2024-08-06 ENCOUNTER — OFFICE VISIT (OUTPATIENT)
Dept: MEDICAL GROUP | Facility: MEDICAL CENTER | Age: 29
End: 2024-08-06
Payer: MEDICAID

## 2024-08-06 VITALS
TEMPERATURE: 97.7 F | HEART RATE: 91 BPM | WEIGHT: 205.9 LBS | HEIGHT: 67 IN | RESPIRATION RATE: 16 BRPM | BODY MASS INDEX: 32.31 KG/M2 | DIASTOLIC BLOOD PRESSURE: 58 MMHG | SYSTOLIC BLOOD PRESSURE: 98 MMHG | OXYGEN SATURATION: 96 %

## 2024-08-06 DIAGNOSIS — F33.3 SEVERE EPISODE OF RECURRENT MAJOR DEPRESSIVE DISORDER, WITH PSYCHOTIC FEATURES (HCC): ICD-10-CM

## 2024-08-06 PROCEDURE — 3074F SYST BP LT 130 MM HG: CPT

## 2024-08-06 PROCEDURE — 99213 OFFICE O/P EST LOW 20 MIN: CPT

## 2024-08-06 PROCEDURE — 3078F DIAST BP <80 MM HG: CPT

## 2024-08-06 ASSESSMENT — ANXIETY QUESTIONNAIRES
3. WORRYING TOO MUCH ABOUT DIFFERENT THINGS: NEARLY EVERY DAY
4. TROUBLE RELAXING: MORE THAN HALF THE DAYS
IF YOU CHECKED OFF ANY PROBLEMS ON THIS QUESTIONNAIRE, HOW DIFFICULT HAVE THESE PROBLEMS MADE IT FOR YOU TO DO YOUR WORK, TAKE CARE OF THINGS AT HOME, OR GET ALONG WITH OTHER PEOPLE: EXTREMELY DIFFICULT
7. FEELING AFRAID AS IF SOMETHING AWFUL MIGHT HAPPEN: NEARLY EVERY DAY
6. BECOMING EASILY ANNOYED OR IRRITABLE: NEARLY EVERY DAY
1. FEELING NERVOUS, ANXIOUS, OR ON EDGE: NEARLY EVERY DAY
5. BEING SO RESTLESS THAT IT IS HARD TO SIT STILL: NEARLY EVERY DAY
2. NOT BEING ABLE TO STOP OR CONTROL WORRYING: NEARLY EVERY DAY
GAD7 TOTAL SCORE: 20

## 2024-08-06 ASSESSMENT — FIBROSIS 4 INDEX: FIB4 SCORE: 0.21

## 2024-08-06 ASSESSMENT — PATIENT HEALTH QUESTIONNAIRE - PHQ9
5. POOR APPETITE OR OVEREATING: 3 - NEARLY EVERY DAY
SUM OF ALL RESPONSES TO PHQ QUESTIONS 1-9: 26
CLINICAL INTERPRETATION OF PHQ2 SCORE: 6

## 2024-08-06 NOTE — PROGRESS NOTES
Verbal consent was acquired by the patient to use [employer] ambient listening note generation during this visit     Subjective:     CC:  There were no encounter diagnoses.    HISTORY OF THE PRESENT ILLNESS: Patient is a 29 y.o. adult.     History of Present Illness      Health Maintenance: reviewed and completed per patient preference.     ROS:   - CONSTITUTIONAL: Denies weight loss, fever and chills.  - HEENT: Denies changes in vision and hearing.  - RESPIRATORY: Denies SOB and cough.  - CV: Denies palpitations and CP.  - GI: Denies abdominal pain, nausea, vomiting and diarrhea.  - : Denies dysuria and urinary frequency.  - MSK: Denies myalgia and joint pain.  - SKIN: Denies rash and pruritus.  - NEUROLOGICAL: Denies headache and syncope.  - PSYCHIATRIC: Denies recent changes in mood. Denies anxiety and depression.           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   Tobacco Use    Smoking status: Never    Smokeless tobacco: Never   Vaping Use    Vaping status: Never Used   Substance and Sexual Activity    Alcohol use: Not Currently     Comment: occ    Drug use: Yes     Types: Marijuana     Comment: gummies    Sexual activity: Not Currently     Partners: Male   Other Topics Concern    Not on file   Social History Narrative    Lives with mother, family     Social Determinants of Health     Financial Resource Strain: Not on file   Food Insecurity: Not on file   Transportation Needs: Not on file   Physical Activity: Not on file   Stress: Not on file   Social Connections: Not on file   Intimate Partner Violence: Not on file   Housing Stability: Not on file      Allergies   Allergen Reactions    Cillins [Penicillins]     Codeine     Sulfa Drugs             Current  "Outpatient Medications:     asa/apap/caffeine, 1 Tablet, Oral, Q6HRS PRN    omeprazole, 20 mg, Oral, DAILY    sucralfate, 1 g, Oral, TID AC    DULoxetine, 30 mg, Oral, DAILY    montelukast, 10 mg, Oral, DAILY    fluticasone, 2 Puff, Inhalation, BID    ergocalciferol, 50,000 Units, Oral, Q7 DAYS    norelgestromin-ethinyl estradiol, 1 Patch, Transdermal, Q7 DAYS    Ibuprofen, Take  by mouth.   Objective:     Exam: BP 98/58 (BP Location: Left arm, Patient Position: Sitting, BP Cuff Size: Adult)   Pulse 91   Temp 36.5 °C (97.7 °F) (Temporal)   Resp 16   Ht 1.702 m (5' 7\")   Wt 93.4 kg (205 lb 14.4 oz)   SpO2 96%  Body mass index is 32.25 kg/m².    Physical Exam:  Constitutional: Alert, no distress, well-groomed.  Skin: Warm, dry, good turgor, no rashes in visible areas.  Eye: Equal, round and reactive, conjunctiva clear, lids normal.  ENMT: Lips without lesions, good dentition, moist mucous membranes.  Neck: Trachea midline, no masses, no thyromegaly.  Respiratory: Unlabored respiratory effort, no cough.  Abd: soft, non tender, non distended, normal BS  MSK: Normal gait, moves all extremities.  Neuro: Grossly non-focal.   Psych: Alert and oriented x3, normal affect and mood.    Labs: Reviewed    Assessment & Plan:   29 y.o. adult with the following -    There are no diagnoses linked to this encounter.    Assessment & Plan            No follow-ups on file.    Please note that this dictation was created using voice recognition software. I have made every reasonable attempt to correct obvious errors, but I expect that there are errors of grammar and possibly content that I did not discover before finalizing the note.        "

## 2024-08-06 NOTE — ASSESSMENT & PLAN NOTE
Patient reports that since starting the cymbalta 30 mg daily she is feeling numbness and is feeling that she could potentially harm herself, other or animals. She reports hearing voices in her head that are yelling at each other in her head and she is unable to determine who to listen too. She has no plan but has been removing herself from social situations due to her fear of hurting others. She has not been able to establish with Psychiatry but has an appointment to see psychology on 8/20/2024. She reports attempts of self harm via overdose, cutting and alcohol. She currently uses marijuana only to help with the voices in her head.

## 2024-08-06 NOTE — PROGRESS NOTES
"Subjective:     CC: Mood check    HPI:   Kirsty presents today with    Major depressive disorder  Patient reports that since starting the cymbalta 30 mg daily she is feeling numbness and is feeling that she could potentially harm herself, other or animals. She reports hearing voices in her head that are yelling at each other in her head and she is unable to determine who to listen too. She has no plan but has been removing herself from social situations due to her fear of hurting others. She has not been able to establish with Psychiatry but has an appointment to see psychology on 8/20/2024. She reports attempts of self harm via overdose, cutting and alcohol. She currently uses marijuana only to help with the voices in her head.          ROS:  - CONSTITUTIONAL: Denies weight loss, fever and chills.  - HEENT: Denies changes in vision and hearing.  - RESPIRATORY: Denies SOB and cough.  - CV: Denies palpitations and CP.  - GI: Denies abdominal pain, nausea, vomiting and diarrhea.  - : Denies dysuria and urinary frequency.  - MSK: Denies myalgia and joint pain.  - SKIN: Denies rash and pruritus.  - NEUROLOGICAL: Denies headache and syncope.  - PSYCHIATRIC: Denies recent changes in mood. Endorses hearing voices, anxiety and depression.    Please see HPI for additional ROS.       Objective:     Exam:  BP 98/58 (BP Location: Left arm, Patient Position: Sitting, BP Cuff Size: Adult)   Pulse 91   Temp 36.5 °C (97.7 °F) (Temporal)   Resp 16   Ht 1.702 m (5' 7\")   Wt 93.4 kg (205 lb 14.4 oz)   SpO2 96%   BMI 32.25 kg/m²  Body mass index is 32.25 kg/m².    Physical Exam:  Constitutional: Alert, no distress, well-groomed.  Skin: Warm, dry, good turgor, no rashes in visible areas.  Eye: Equal, round and reactive, conjunctiva clear, lids normal.  ENMT: Lips without lesions, good dentition, moist mucous membranes.  Neck: Trachea midline, no masses, no thyromegaly.  Respiratory: Unlabored respiratory effort, no " cough.  Abd: soft, non tender, non distended, normal BS  MSK: Normal gait, moves all extremities.  Neuro: Grossly non-focal.   Psych: Alert and oriented x3, normal affect and mood        8/6/2024     3:00 PM 7/9/2024     3:00 PM 5/8/2024     9:40 AM 10/12/2016    10:00 AM 5/15/2015     3:40 PM   PHQ-9 Screening   Little interest or pleasure in doing things     Not at all   Little interest or pleasure in doing things 3 - nearly every day 3 - nearly every day 3 - nearly every day 0 - not at all    Feeling down, depressed, or hopeless     Not at all   Feeling down, depressed, or hopeless 3 - nearly every day 3 - nearly every day 3 - nearly every day 0 - not at all    Trouble falling or staying asleep, or sleeping too much 3 - nearly every day 3 - nearly every day 3 - nearly every day     Feeling tired or having little energy 3 - nearly every day 2 - more than half the days 3 - nearly every day     Poor appetite or overeating 3 - nearly every day 3 - nearly every day 3 - nearly every day     Feeling bad about yourself - or that you are a failure or have let yourself or your family down 3 - nearly every day 3 - nearly every day 3 - nearly every day     Trouble concentrating on things, such as reading the newspaper or watching television 2 - more than half the days 2 - more than half the days 2 - more than half the days     Moving or speaking so slowly that other people could have noticed. Or the opposite - being so fidgety or restless that you have been moving around a lot more than usual 3 - nearly every day 3 - nearly every day 2 - more than half the days     Thoughts that you would be better off dead, or of hurting yourself in some way 3 - nearly every day 3 - nearly every day 3 - nearly every day     PHQ-2 Total Score     0   PHQ-2 Total Score 6 6 6 0    PHQ-9 Total Score 26 25 25         Interpretation of PHQ-9 Total Score   Score Severity   1-4 No Depression   5-9 Mild Depression   10-14 Moderate Depression   15-19  Moderately Severe Depression   20-27 Severe Depression    BISHNU-7 Questionnaire    Feeling nervous, anxious, or on edge: Nearly every day  Not being able to sop or control worrying: Nearly every day  Worrying too much about different things: Nearly every day  Trouble relaxing: More than half the days  Being so restless that it's hard to sit still: Nearly every day  Becoming easily annoyed or irritable: Nearly every day  Feeling afraid as if something awful might happen: Nearly every day  Total: 20    Interpretation of BISHNU 7 Total Score   Score Severity :  0-4 No Anxiety   5-9 Mild Anxiety  10-14 Moderate Anxiety  15-21 Severe Anxiety    Labs: Reviewed    Assessment & Plan:     29 y.o. adult with the following -     1. Severe episode of recurrent major depressive disorder, with psychotic features (HCC)  Acute on chronic issue.  She has no current plans of SI or HI but does report that her intrusive thoughts are becoming more frequent.  She declines the desire to follow-up in the emergency room.  Given her progressive thoughts she is going to follow-up immediately with [facility] for immediate evaluation and treatments.  Discussed following up with with me as soon as she is released.      Return if symptoms worsen or fail to improve.    Please note that this dictation was created using voice recognition software. I have made every reasonable attempt to correct obvious errors, but I expect that there are errors of grammar and possibly content that I did not discover before finalizing the note.         yes

## 2024-09-05 ENCOUNTER — APPOINTMENT (OUTPATIENT)
Dept: PHARMACY | Facility: MEDICAL CENTER | Age: 29
End: 2024-09-05
Payer: MEDICAID

## 2024-09-12 ENCOUNTER — APPOINTMENT (OUTPATIENT)
Dept: PHARMACY | Facility: MEDICAL CENTER | Age: 29
End: 2024-09-12
Payer: MEDICAID

## 2024-09-19 ENCOUNTER — APPOINTMENT (OUTPATIENT)
Dept: MEDICAL GROUP | Facility: MEDICAL CENTER | Age: 29
End: 2024-09-19
Payer: MEDICAID

## 2024-09-19 ENCOUNTER — APPOINTMENT (OUTPATIENT)
Dept: PHARMACY | Facility: MEDICAL CENTER | Age: 29
End: 2024-09-19
Payer: MEDICAID

## 2024-09-22 DIAGNOSIS — K21.9 GASTROESOPHAGEAL REFLUX DISEASE WITHOUT ESOPHAGITIS: ICD-10-CM

## 2024-09-25 RX ORDER — SUCRALFATE 1 G/1
1 TABLET ORAL
Qty: 90 TABLET | Refills: 0 | Status: SHIPPED | OUTPATIENT
Start: 2024-09-25

## 2024-09-25 NOTE — TELEPHONE ENCOUNTER
Received request via: Patient    Was the patient seen in the last year in this department? Yes    Does the patient have an active prescription (recently filled or refills available) for medication(s) requested? No    Pharmacy Name: [facility]    Does the patient have retirement Plus and need 100-day supply? (This applies to ALL medications) Patient does not have SCP    Future Appointments         Provider Department Center    10/1/2024 1:40 PM (Arrive by 1:25 PM) [facility]    10/1/2024 3:00 PM [facility]

## 2024-10-01 ENCOUNTER — APPOINTMENT (OUTPATIENT)
Dept: PHARMACY | Facility: MEDICAL CENTER | Age: 29
End: 2024-10-01
Payer: MEDICAID

## 2024-12-31 ENCOUNTER — OFFICE VISIT (OUTPATIENT)
Dept: MEDICAL GROUP | Facility: MEDICAL CENTER | Age: 29
End: 2024-12-31
Payer: MEDICAID

## 2024-12-31 VITALS
DIASTOLIC BLOOD PRESSURE: 60 MMHG | TEMPERATURE: 98.3 F | SYSTOLIC BLOOD PRESSURE: 94 MMHG | OXYGEN SATURATION: 96 % | WEIGHT: 207.2 LBS | BODY MASS INDEX: 32.45 KG/M2 | HEART RATE: 73 BPM | RESPIRATION RATE: 16 BRPM

## 2024-12-31 DIAGNOSIS — J02.9 PHARYNGITIS, UNSPECIFIED ETIOLOGY: ICD-10-CM

## 2024-12-31 DIAGNOSIS — K27.9 PUD (PEPTIC ULCER DISEASE): ICD-10-CM

## 2024-12-31 DIAGNOSIS — R05.2 SUBACUTE COUGH: ICD-10-CM

## 2024-12-31 DIAGNOSIS — M54.2 PAIN OF CERVICAL SPINE: ICD-10-CM

## 2024-12-31 DIAGNOSIS — F33.2 SEVERE EPISODE OF RECURRENT MAJOR DEPRESSIVE DISORDER, WITHOUT PSYCHOTIC FEATURES (HCC): ICD-10-CM

## 2024-12-31 DIAGNOSIS — R19.7 DIARRHEA, UNSPECIFIED TYPE: ICD-10-CM

## 2024-12-31 DIAGNOSIS — E55.9 VITAMIN D DEFICIENCY: ICD-10-CM

## 2024-12-31 DIAGNOSIS — M54.50 CHRONIC LOW BACK PAIN, UNSPECIFIED BACK PAIN LATERALITY, UNSPECIFIED WHETHER SCIATICA PRESENT: ICD-10-CM

## 2024-12-31 DIAGNOSIS — Z30.40 ENCOUNTER FOR SURVEILLANCE OF CONTRACEPTIVES: ICD-10-CM

## 2024-12-31 DIAGNOSIS — G89.29 CHRONIC LOW BACK PAIN, UNSPECIFIED BACK PAIN LATERALITY, UNSPECIFIED WHETHER SCIATICA PRESENT: ICD-10-CM

## 2024-12-31 DIAGNOSIS — K21.9 GASTROESOPHAGEAL REFLUX DISEASE WITHOUT ESOPHAGITIS: ICD-10-CM

## 2024-12-31 DIAGNOSIS — J45.40 MODERATE PERSISTENT ASTHMA WITHOUT COMPLICATION: ICD-10-CM

## 2024-12-31 DIAGNOSIS — F41.9 ANXIETY: ICD-10-CM

## 2024-12-31 LAB
POCT INT CON NEG: NEGATIVE
POCT INT CON POS: POSITIVE
POCT URINE PREGNANCY TEST: NEGATIVE

## 2024-12-31 PROCEDURE — 81025 URINE PREGNANCY TEST: CPT

## 2024-12-31 PROCEDURE — 99213 OFFICE O/P EST LOW 20 MIN: CPT

## 2024-12-31 RX ORDER — MONTELUKAST SODIUM 10 MG/1
10 TABLET ORAL DAILY
Qty: 30 TABLET | Refills: 2 | Status: SHIPPED | OUTPATIENT
Start: 2024-12-31

## 2024-12-31 RX ORDER — DULOXETIN HYDROCHLORIDE 30 MG/1
30 CAPSULE, DELAYED RELEASE ORAL DAILY
Qty: 30 CAPSULE | Refills: 2 | Status: SHIPPED | OUTPATIENT
Start: 2024-12-31

## 2024-12-31 RX ORDER — SUCRALFATE 1 G/1
1 TABLET ORAL
Qty: 90 TABLET | Refills: 0 | Status: SHIPPED | OUTPATIENT
Start: 2024-12-31

## 2024-12-31 RX ORDER — NORELGESTROMIN AND ETHINYL ESTRADIOL 35; 150 UG/MG; UG/MG
1 PATCH TRANSDERMAL
Qty: 12 PATCH | Refills: 4 | Status: SHIPPED | OUTPATIENT
Start: 2024-12-31

## 2024-12-31 RX ORDER — ERGOCALCIFEROL 1.25 MG/1
50000 CAPSULE ORAL
Qty: 12 CAPSULE | Refills: 3 | Status: SHIPPED | OUTPATIENT
Start: 2024-12-31

## 2024-12-31 RX ORDER — HYDROXYZINE HYDROCHLORIDE 25 MG/1
25 TABLET, FILM COATED ORAL 3 TIMES DAILY PRN
Qty: 30 TABLET | Refills: 0 | Status: SHIPPED | OUTPATIENT
Start: 2024-12-31

## 2024-12-31 RX ORDER — FLUTICASONE PROPIONATE 110 UG/1
2 AEROSOL, METERED RESPIRATORY (INHALATION) 2 TIMES DAILY
Qty: 1 EACH | Refills: 2 | Status: SHIPPED | OUTPATIENT
Start: 2024-12-31

## 2024-12-31 RX ORDER — PROPRANOLOL HYDROCHLORIDE 10 MG/1
10 TABLET ORAL DAILY
Qty: 30 TABLET | Refills: 2 | Status: SHIPPED | OUTPATIENT
Start: 2024-12-31

## 2024-12-31 ASSESSMENT — FIBROSIS 4 INDEX: FIB4 SCORE: 0.21

## 2024-12-31 NOTE — PROGRESS NOTES
Verbal consent was acquired by the patient to use [employer] ambient listening note generation during this visit     Subjective:     CC:  Diagnoses of Gastroesophageal reflux disease without esophagitis, Diarrhea, unspecified type, PUD (peptic ulcer disease), Encounter for surveillance of contraceptives, Pharyngitis, unspecified etiology, Moderate persistent asthma without complication, Severe episode of recurrent major depressive disorder, without psychotic features (HCC), Vitamin D deficiency, Subacute cough, Anxiety, Chronic low back pain, unspecified back pain laterality, unspecified whether sciatica present, and Pain of cervical spine were pertinent to this visit.    HISTORY OF THE PRESENT ILLNESS: Patient is a 29 y.o. person.     History of Present Illness  The patient presents for evaluation of gastrointestinal issues, chronic pain, anxiety, allergies, and medication refills.    Recently, they experienced an episode of severe vomiting and coughing, which resulted in the expectoration of blood. This incident led to a visit to the emergency room at [facility], where they were administered Toradol and Zofran. Despite these alarming symptoms, no immediate threats to their health were identified. They have a known history of gastrointestinal issues, including ulcers, and a family history of stomach tumors and cancer. They have been experiencing diarrhea for over a year, which they attribute to a lack of fiber in their diet.    They also report chronic neck pain, which has been causing frequent headaches. They are currently unemployed and have been considering applying for disability due to their mental health issues and chronic pain. They have not undergone any recent imaging of their spine, with the last one being conducted in 2016 or 2017 following an initial injury. They have a history of a herniated disc in their lower back, which continues to exert pressure on their sciatic nerve. During therapy for  this condition, they were asked to reach up to their neck, which has resulted in chronic neck pain. This issue was not documented or treated at the time. They also report a lump on their neck.    They have been using Benadryl as a substitute for their allergy medication and have been using an inhaler.    They have been taking propranolol for anxiety and blood pressure, which they report has been effective. They have been prescribed hydroxyzine as needed for anxiety. They feel that their mental health has deteriorated slightly, which they believe is due to not taking their medications. They have been receiving counseling from [facility], which they find beneficial.    They are currently on birth control and do not believe they are pregnant.    They have run out of Cymbalta, which was increased from 30 mg to 60 mg by [facility].    They have been using Flovent inhaler, taking 2 puffs daily.    FAMILY HISTORY  The patient has a family history of stomach tumors and stomach cancer.    MEDICATIONS  Current: Carafate, omeprazole, Singulair (montelukast), Flovent, vitamin D, Cymbalta, propranolol, hydroxyzine, Benadryl, Tessalon Perles, Zofran    Health Maintenance: reviewed and completed per patient preference.     ROS:  PER HPI.            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   Tobacco Use    Smoking status: Never    Smokeless tobacco: Never   Vaping Use    Vaping status: Never Used   Substance and Sexual Activity    Alcohol use: Not Currently     Comment: occ    Drug use: Yes     Types: Marijuana     Comment: gummies    Sexual activity: Not Currently     Partners: Male   Other Topics Concern    Not on file   Social History Narrative    Lives  with mother, family     Social Drivers of Health     Financial Resource Strain: Not on file   Food Insecurity: Not on file   Transportation Needs: Not on file   Physical Activity: Not on file   Stress: Not on file   Social Connections: Not on file   Intimate Partner Violence: Not on file   Housing Stability: Not on file      Allergies   Allergen Reactions    Cillins [Penicillins]     Codeine     Sulfa Drugs             Current Outpatient Medications:     sucralfate, 1 g, Oral, TID AC, Taking    omeprazole, 20 mg, Oral, DAILY, Taking    norelgestromin-ethinyl estradiol, 1 Patch, Transdermal, Q7 DAYS, Taking    montelukast, 10 mg, Oral, DAILY, Taking    DULoxetine, 30 mg, Oral, DAILY, Taking    ergocalciferol, 50,000 Units, Oral, Q7 DAYS, Taking    fluticasone, 2 Puff, Inhalation, BID, Taking    propranolol, 10 mg, Oral, DAILY, Taking    hydrOXYzine HCl, 25 mg, Oral, TID PRN, Taking As Needed    asa/apap/caffeine, 1 Tablet, Oral, Q6HRS PRN    Ibuprofen, Take  by mouth.   Objective:     Exam: BP 94/60 (BP Location: Right arm, Patient Position: Sitting, BP Cuff Size: Adult)   Pulse 73   Temp 36.8 °C (98.3 °F) (Temporal)   Resp 16   Wt 94 kg (207 lb 3.2 oz)   SpO2 96%  Body mass index is 32.45 kg/m².    Physical Exam:  Constitutional: Alert, no distress, well-groomed.  Skin: Warm, dry, good turgor, no rashes in visible areas.  Eye: Equal, round and reactive, conjunctiva clear, lids normal.  ENMT: Lips without lesions, good dentition, moist mucous membranes.  Neck: Trachea midline, no masses, no thyromegaly.  Respiratory: Unlabored respiratory effort, no cough.  Abd: soft, non tender, non distended, normal BS  MSK: Normal gait, moves all extremities.  Neuro: Grossly non-focal.   Psych: Alert and oriented x3, normal affect and mood.    Labs: Reviewed    Assessment & Plan:   29 y.o. person with the following -    1. Gastroesophageal reflux disease without esophagitis  - Referral to Gastroenterology  - sucralfate  (CARAFATE) 1 GM Tab; Take 1 Tablet by mouth 3 times a day before meals.  Dispense: 90 Tablet; Refill: 0  - omeprazole (PRILOSEC) 20 MG delayed-release capsule; Take 1 Capsule by mouth every day.  Dispense: 30 Capsule; Refill: 2    2. Diarrhea, unspecified type  - Referral to Gastroenterology    3. PUD (peptic ulcer disease)  - Referral to Gastroenterology    4. Encounter for surveillance of contraceptives  - POCT Pregnancy  - norelgestromin-ethinyl estradiol (ORTHO EVRA) 150-35 MCG/24HR patch; Place 1 Patch on the skin every 7 days.  Dispense: 12 Patch; Refill: 4    5. Pharyngitis, unspecified etiology  - montelukast (SINGULAIR) 10 MG Tab; Take 1 Tablet by mouth every day.  Dispense: 30 Tablet; Refill: 2    6. Moderate persistent asthma without complication  - montelukast (SINGULAIR) 10 MG Tab; Take 1 Tablet by mouth every day.  Dispense: 30 Tablet; Refill: 2    7. Severe episode of recurrent major depressive disorder, without psychotic features (HCC)  - DULoxetine (CYMBALTA) 30 MG Cap DR Particles; Take 1 Capsule by mouth every day.  Dispense: 30 Capsule; Refill: 2    8. Vitamin D deficiency  - ergocalciferol (DRISDOL) 15087 UNIT capsule; Take 1 Capsule by mouth every 7 days.  Dispense: 12 Capsule; Refill: 3    9. Subacute cough  - fluticasone (FLOVENT HFA) 110 MCG/ACT Aerosol; Inhale 2 Puffs 2 times a day.  Dispense: 1 Each; Refill: 2    10. Anxiety  - propranolol (INDERAL) 10 MG Tab; Take 1 Tablet by mouth every day.  Dispense: 30 Tablet; Refill: 2  - hydrOXYzine HCl (ATARAX) 25 MG Tab; Take 1 Tablet by mouth 3 times a day as needed for Anxiety.  Dispense: 30 Tablet; Refill: 0    11. Chronic low back pain, unspecified back pain laterality, unspecified whether sciatica present    12. Pain of cervical spine  - DX-LUMBAR SPINE-2 OR 3 VIEWS; Future  - DX-CERVICAL SPINE-2 OR 3 VIEWS; Future  - DX-THORACIC SPINE-2 VIEWS; Future      Assessment & Plan  1. Gastrointestinal issues.  A referral to a gastroenterologist will  be initiated for further evaluation and management.    2. Chronic pain.  Radiographic imaging of the cervical, thoracic, and lumbar spine in 2 views each will be ordered to assess the underlying cause of the pain.    3. Anxiety.  Prescriptions for hydroxyzine and propranolol will be renewed to manage anxiety symptoms.    4. Allergies.  Prescriptions for montelukast and Flovent inhaler will be renewed to manage allergy symptoms.    5. Medication management.  Prescriptions for vitamin D, Cymbalta, omeprazole, and Carafate will be renewed. The patient will start Cymbalta at 30 mg and will return for a follow-up to increase the dose to 60 mg.    6. Birth control.  A pregnancy test will be conducted before renewing the prescription for birth control patches.          Return if symptoms worsen or fail to improve.    Please note that this dictation was created using voice recognition software. I have made every reasonable attempt to correct obvious errors, but I expect that there are errors of grammar and possibly content that I did not discover before finalizing the note.

## 2025-01-09 ENCOUNTER — HOSPITAL ENCOUNTER (OUTPATIENT)
Dept: RADIOLOGY | Facility: MEDICAL CENTER | Age: 30
End: 2025-01-09
Payer: MEDICAID

## 2025-01-09 DIAGNOSIS — M54.2 PAIN OF CERVICAL SPINE: ICD-10-CM

## 2025-01-09 PROCEDURE — 72070 X-RAY EXAM THORAC SPINE 2VWS: CPT

## 2025-01-09 PROCEDURE — 72100 X-RAY EXAM L-S SPINE 2/3 VWS: CPT

## 2025-01-09 PROCEDURE — 72040 X-RAY EXAM NECK SPINE 2-3 VW: CPT

## 2025-01-16 ENCOUNTER — OFFICE VISIT (OUTPATIENT)
Dept: MEDICAL GROUP | Facility: MEDICAL CENTER | Age: 30
End: 2025-01-16
Payer: MEDICAID

## 2025-01-16 VITALS
RESPIRATION RATE: 16 BRPM | HEART RATE: 56 BPM | OXYGEN SATURATION: 98 % | BODY MASS INDEX: 32.37 KG/M2 | SYSTOLIC BLOOD PRESSURE: 90 MMHG | DIASTOLIC BLOOD PRESSURE: 70 MMHG | TEMPERATURE: 97.4 F | WEIGHT: 206.7 LBS

## 2025-01-16 DIAGNOSIS — F33.2 SEVERE EPISODE OF RECURRENT MAJOR DEPRESSIVE DISORDER, WITHOUT PSYCHOTIC FEATURES (HCC): ICD-10-CM

## 2025-01-16 DIAGNOSIS — Z23 FLU VACCINE NEED: ICD-10-CM

## 2025-01-16 DIAGNOSIS — M79.671 RIGHT FOOT PAIN: ICD-10-CM

## 2025-01-16 DIAGNOSIS — G89.29 CHRONIC MIDLINE LOW BACK PAIN WITHOUT SCIATICA: ICD-10-CM

## 2025-01-16 DIAGNOSIS — M54.50 CHRONIC MIDLINE LOW BACK PAIN WITHOUT SCIATICA: ICD-10-CM

## 2025-01-16 PROCEDURE — 99214 OFFICE O/P EST MOD 30 MIN: CPT

## 2025-01-16 PROCEDURE — 3074F SYST BP LT 130 MM HG: CPT

## 2025-01-16 PROCEDURE — 90471 IMMUNIZATION ADMIN: CPT

## 2025-01-16 PROCEDURE — 3078F DIAST BP <80 MM HG: CPT

## 2025-01-16 PROCEDURE — 99213 OFFICE O/P EST LOW 20 MIN: CPT | Mod: 25

## 2025-01-16 ASSESSMENT — ANXIETY QUESTIONNAIRES
1. FEELING NERVOUS, ANXIOUS, OR ON EDGE: NEARLY EVERY DAY
IF YOU CHECKED OFF ANY PROBLEMS ON THIS QUESTIONNAIRE, HOW DIFFICULT HAVE THESE PROBLEMS MADE IT FOR YOU TO DO YOUR WORK, TAKE CARE OF THINGS AT HOME, OR GET ALONG WITH OTHER PEOPLE: EXTREMELY DIFFICULT
4. TROUBLE RELAXING: NEARLY EVERY DAY
5. BEING SO RESTLESS THAT IT IS HARD TO SIT STILL: NEARLY EVERY DAY
3. WORRYING TOO MUCH ABOUT DIFFERENT THINGS: NEARLY EVERY DAY
7. FEELING AFRAID AS IF SOMETHING AWFUL MIGHT HAPPEN: NEARLY EVERY DAY
2. NOT BEING ABLE TO STOP OR CONTROL WORRYING: NEARLY EVERY DAY
6. BECOMING EASILY ANNOYED OR IRRITABLE: NEARLY EVERY DAY
GAD7 TOTAL SCORE: 21

## 2025-01-16 ASSESSMENT — FIBROSIS 4 INDEX: FIB4 SCORE: 0.21

## 2025-01-16 NOTE — PROGRESS NOTES
Verbal consent was acquired by the patient to use [employer] ambient listening note generation during this visit     Subjective:     CC:  Diagnoses of Right foot pain, Chronic midline low back pain without sciatica, Severe episode of recurrent major depressive disorder, without psychotic features (HCC), and Flu vaccine need were pertinent to this visit.    HISTORY OF THE PRESENT ILLNESS: Patient is a 29 y.o. person.     History of Present Illness  The patient presents for evaluation of foot pain, chronic back pain, and depression.    She reports experiencing pain in her foot, describing it as a sensation akin to having glass embedded in her skin. She has not had any instances of stepping on sharp objects while barefoot, as she consistently wears slippers. The onset of this pain was sudden, occurring upon waking up one morning. She notes that the pain is exacerbated by walking and applying pressure to the affected area. This is not the first occurrence of such symptoms, which typically resolve spontaneously after a few days. She also mentions the presence of a small lump in the affected area but does not observe any puncture wounds or signs of a spider bite. She is uncertain about the status of her tetanus vaccination.    She has been adhering to a regimen of good posture and light stretching exercises. However, she has been experiencing excessive sleepiness recently, which she attributes to either her medication or her depression. She reports falling asleep shortly after taking her morning medications, even after a full night's sleep. Her sleep duration has been notably long, often extending to 12 to 14 hours. She has been on Cymbalta 30 mg for the past 3 weeks but has not observed any significant changes due to her increased sleep duration. She has previously undergone physical therapy for her chronic pain, which she rates as 7 or 8 on a scale of 10. She notes that her pain intensifies during her menstrual cycle,  reaching a severity of 3 to 4 times her usual pain level.    Supplemental Information  She has plantar fasciitis.    FAMILY HISTORY  The patient has a family history of gout.    MEDICATIONS  Cymbalta    IMMUNIZATIONS  The patient's Tdap is up to date.    Health Maintenance: reviewed and completed per patient preference.     ROS:   Per HPI.            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   Tobacco Use    Smoking status: Never    Smokeless tobacco: Never   Vaping Use    Vaping status: Never Used   Substance and Sexual Activity    Alcohol use: Not Currently     Comment: occ    Drug use: Yes     Types: Marijuana     Comment: gummies    Sexual activity: Not Currently     Partners: Male   Other Topics Concern    Not on file   Social History Narrative    Lives with mother, family     Social Drivers of Health     Financial Resource Strain: Not on file   Food Insecurity: Not on file   Transportation Needs: Not on file   Physical Activity: Not on file   Stress: Not on file   Social Connections: Not on file   Intimate Partner Violence: Not on file   Housing Stability: Not on file      Allergies   Allergen Reactions    Cillins [Penicillins]     Codeine     Sulfa Drugs             Current Outpatient Medications:     sucralfate, 1 g, Oral, TID AC    omeprazole, 20 mg, Oral, DAILY    norelgestromin-ethinyl estradiol, 1 Patch, Transdermal, Q7 DAYS    montelukast, 10 mg, Oral, DAILY    DULoxetine, 30 mg, Oral, DAILY    ergocalciferol, 50,000 Units, Oral, Q7 DAYS    fluticasone, 2 Puff, Inhalation, BID    propranolol, 10 mg, Oral, DAILY    hydrOXYzine HCl, 25 mg, Oral, TID PRN    asa/apap/caffeine, 1 Tablet, Oral, Q6HRS PRN    Ibuprofen, Take  by mouth.   Objective:     Exam: BP  90/70 (BP Location: Right arm, Patient Position: Sitting, BP Cuff Size: Adult)   Pulse (!) 56   Temp 36.3 °C (97.4 °F) (Temporal)   Resp 16   Wt 93.8 kg (206 lb 11.2 oz)   SpO2 98%  Body mass index is 32.37 kg/m².    Physical Exam:  Constitutional: Alert, no distress, well-groomed.  Skin: Warm, dry, good turgor, no rashes in visible areas.  Eye: Equal, round and reactive, conjunctiva clear, lids normal.  ENMT: Lips without lesions, good dentition, moist mucous membranes.  Neck: Trachea midline, no masses, no thyromegaly.  Respiratory: Unlabored respiratory effort, no cough.  Abd: soft, non tender, non distended, normal BS  MSK: Normal gait, moves all extremities.  Right foot: dime sized red zuly to sole of foot at the base of her 4th right toe. Mild tenderness. No surrounding erythema or warmth.   Neuro: Grossly non-focal.   Psych: Alert and oriented x3, normal affect and mood.    Labs: Reviewed    Assessment & Plan:   29 y.o. person with the following -    1. Right foot pain    2. Chronic midline low back pain without sciatica    3. Severe episode of recurrent major depressive disorder, without psychotic features (HCC)    4. Flu vaccine need  - INFLUENZA VACCINE TRI INJ (PF)      Assessment & Plan  1. Foot pain.  The symptoms do not align with a diagnosis of gout. There is no visible foreign body in the foot, and the absence of heat suggests no infection. The pain could be due to footwear-induced pressure. She is advised to soak her foot in Epsom salt or magnesium salts. An x-ray of the foot will be ordered if there is no improvement within a week. If signs of infection such as redness, heat, swelling, or drainage appear, she should return for further evaluation.    2. Chronic back pain.  X-rays showed a possible stress fracture in the lumbar spine at L5. She is advised to continue with Cymbalta 30 mg, Tylenol, and ibuprofen as tolerated. She should also engage in stretching exercises and body weight  exercises, and consider a home exercise program for back pain. If Cymbalta proves effective, the dosage may be increased. If it is ineffective, a referral to pain management will be considered.    3. Depression.  She reports excessive sleepiness, which may be related to her medication. She is advised to take her medications at night with dinner to potentially reduce daytime sleepiness. If sleepiness persists, an alternative medication may be considered.    4. Health maintenance.  She will receive her influenza vaccine today. Her Tdap is up to date.    Follow-up  The patient will follow up on 02/04/2025.        Return in about 19 days (around 2/4/2025).    Please note that this dictation was created using voice recognition software. I have made every reasonable attempt to correct obvious errors, but I expect that there are errors of grammar and possibly content that I did not discover before finalizing the note.

## 2025-01-23 DIAGNOSIS — F41.9 ANXIETY: ICD-10-CM

## 2025-01-27 NOTE — TELEPHONE ENCOUNTER
Received request via: Pharmacy    Was the patient seen in the last year in this department? Yes    Does the patient have an active prescription (recently filled or refills available) for medication(s) requested? No    Pharmacy Name: [facility]    Does the patient have intermediate Plus and need 100-day supply? (This applies to ALL medications) Patient does not have SCP

## 2025-01-28 RX ORDER — HYDROXYZINE HYDROCHLORIDE 25 MG/1
25 TABLET, FILM COATED ORAL 3 TIMES DAILY PRN
Qty: 30 TABLET | Refills: 5 | Status: SHIPPED | OUTPATIENT
Start: 2025-01-28

## 2025-02-04 ENCOUNTER — OFFICE VISIT (OUTPATIENT)
Dept: MEDICAL GROUP | Facility: MEDICAL CENTER | Age: 30
End: 2025-02-04
Payer: MEDICAID

## 2025-02-04 VITALS
SYSTOLIC BLOOD PRESSURE: 108 MMHG | HEART RATE: 85 BPM | RESPIRATION RATE: 16 BRPM | DIASTOLIC BLOOD PRESSURE: 70 MMHG | WEIGHT: 206.4 LBS | OXYGEN SATURATION: 97 % | TEMPERATURE: 96.9 F | BODY MASS INDEX: 32.33 KG/M2

## 2025-02-04 DIAGNOSIS — F33.3 SEVERE EPISODE OF RECURRENT MAJOR DEPRESSIVE DISORDER, WITH PSYCHOTIC FEATURES (HCC): ICD-10-CM

## 2025-02-04 DIAGNOSIS — F41.9 ANXIETY: ICD-10-CM

## 2025-02-04 DIAGNOSIS — R05.3 CHRONIC COUGH: ICD-10-CM

## 2025-02-04 PROCEDURE — 99214 OFFICE O/P EST MOD 30 MIN: CPT

## 2025-02-04 PROCEDURE — 3078F DIAST BP <80 MM HG: CPT

## 2025-02-04 PROCEDURE — 3074F SYST BP LT 130 MM HG: CPT

## 2025-02-04 PROCEDURE — 99212 OFFICE O/P EST SF 10 MIN: CPT

## 2025-02-04 RX ORDER — DULOXETIN HYDROCHLORIDE 60 MG/1
60 CAPSULE, DELAYED RELEASE ORAL DAILY
COMMUNITY

## 2025-02-04 RX ORDER — PROPRANOLOL HYDROCHLORIDE 10 MG/1
10 TABLET ORAL DAILY
Qty: 90 TABLET | Refills: 2 | Status: SHIPPED | OUTPATIENT
Start: 2025-02-04

## 2025-02-04 ASSESSMENT — FIBROSIS 4 INDEX: FIB4 SCORE: 0.21

## 2025-02-05 NOTE — PROGRESS NOTES
Verbal consent was acquired by the patient to use [email or web address] ambient listening note generation during this visit     Subjective:     CC:  Diagnoses of Anxiety, Severe episode of recurrent major depressive disorder, with psychotic features (HCC), and Chronic cough were pertinent to this visit.    HISTORY OF THE PRESENT ILLNESS: Patient is a 29 y.o. person.     History of Present Illness  The patient presents for a follow-up on her Cymbalta, chronic cough, GERD, allergies, and vitamin D deficiency.    She has recently transitioned from a 30 mg to a 60 mg dosage of Cymbalta, which she started this week. She has established care with a psychiatrist who manages her medications and has ordered several tests, which she has not yet completed due to recent illness. Her next appointment with the psychiatrist is scheduled for 02/19/2025. She is also seeking a new psychologist due to the current one being overbooked. She is considering applying for disability due to severe depression and anxiety.    She reports a persistent cough since edy COVID-19 in January 2024. The cough is dry and occasionally produces postnasal drip. She experiences a metallic taste in her mouth when coughing excessively, which can lead to vomiting. She uses a Vicks inhaler once every other day when experiencing nasal congestion. She has been using a fan for white noise during sleep since childhood.    She continues to take omeprazole and sucralfate for GERD management.    She is currently on Singulair for allergies, which she finds beneficial. She also uses Flovent, taking 2 puffs daily, and Benadryl as needed. She notes that her allergies have worsened since returning to [place] from [place].    She reports a poor appetite, consuming only one meal per day, typically consisting of crackers or chips. She does incorporate protein into her diet but experiences some weakness. She is lactose intolerant and prefers Provolone cheese due to its low  lactose content. She also consumes cashews and pistachios.    ALLERGIES  The patient is lactose intolerant.    MEDICATIONS  Current: Cymbalta, propranolol, omeprazole, sucralfate, montelukast, fluticasone, Benadryl    Health Maintenance: reviewed and completed per patient preference.     ROS:   PER HPI.           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   Tobacco Use    Smoking status: Never    Smokeless tobacco: Never   Vaping Use    Vaping status: Never Used   Substance and Sexual Activity    Alcohol use: Not Currently     Comment: occ    Drug use: Yes     Types: Marijuana     Comment: gummies    Sexual activity: Not Currently     Partners: Male   Other Topics Concern    Not on file   Social History Narrative    Lives with mother, family     Social Drivers of Health     Financial Resource Strain: Not on file   Food Insecurity: Not on file   Transportation Needs: Not on file   Physical Activity: Not on file   Stress: Not on file   Social Connections: Not on file   Intimate Partner Violence: Not on file   Housing Stability: Not on file      Allergies   Allergen Reactions    Cillins [Penicillins]     Codeine     Sulfa Drugs             Current Outpatient Medications:     propranolol, 10 mg, Oral, DAILY, Taking    DULoxetine, 60 mg, Oral, DAILY, Taking    Saline Spray, 1 Application, Nasal, Nightly, Taking    hydrOXYzine HCl, 25 mg, Oral, TID PRN    sucralfate, 1 g, Oral, TID AC    omeprazole, 20 mg, Oral, DAILY    norelgestromin-ethinyl estradiol, 1 Patch, Transdermal, Q7 DAYS    montelukast, 10 mg, Oral, DAILY    ergocalciferol, 50,000 Units, Oral, Q7 DAYS    fluticasone, 2 Puff, Inhalation, BID    asa/apap/caffeine, 1 Tablet, Oral, Q6HRS PRN    Ibuprofen, Take  by  mouth.   Objective:     Exam: /70 (BP Location: Right arm, Patient Position: Sitting, BP Cuff Size: Adult)   Pulse 85   Temp 36.1 °C (96.9 °F) (Temporal)   Resp 16   Wt 93.6 kg (206 lb 6.4 oz)   SpO2 97%  Body mass index is 32.33 kg/m².    Physical Exam:  Constitutional: Alert, no distress, well-groomed.  Skin: Warm, dry, good turgor, no rashes in visible areas.  Eye: Equal, round and reactive, conjunctiva clear, lids normal.  ENMT: Lips without lesions, good dentition, moist mucous membranes.  Neck: Trachea midline, no masses, no thyromegaly.  Respiratory: Unlabored respiratory effort, no cough.  Abd: soft, non tender, non distended, normal BS  MSK: Normal gait, moves all extremities.  Neuro: Grossly non-focal.   Psych: Alert and oriented x3, normal affect and mood.    Labs: Reviewed    Assessment & Plan:   29 y.o. person with the following -    1. Anxiety  - propranolol (INDERAL) 10 MG Tab; Take 1 Tablet by mouth every day.  Dispense: 90 Tablet; Refill: 2    2. Severe episode of recurrent major depressive disorder, with psychotic features (HCC)      3. Chronic cough  - Saline Spray 0.65 % Solution; Administer 1 Application into affected nostril(S) every evening.  Dispense: 88 mL; Refill: 5    Assessment & Plan  1. Depression.  The patient has been following up with her psychiatrist and has increased her Cymbalta to 60 mg daily. She is advised to continue this dosage. A refill for propranolol has been sent to the pharmacy as a 90-day prescription.    2. Chronic cough.  The chronic cough may be due to postnasal drip, GERD, or constant fan use during sleep. She is advised to use nasal saline every night before bed and to try sleeping with a humidifier. She should reduce the use of the Vicks inhaler and fluticasone, titrating off as tolerated. She should continue using Singulair (montelukast) as prescribed.    3. GERD.  She is advised to continue using omeprazole and sucralfate as prescribed. An H. pylori  breath test has been ordered to further evaluate her condition.    4. Vitamin D deficiency.  Her vitamin D levels are significantly low, which can affect mood. A follow-up lab for vitamin D has been ordered. She is advised to increase her protein intake to at least 90 g per day, ideally 150 g, focusing on lean chicken, yogurt, milk, eggs, beans, lentils, legumes, and nuts.    5. Allergies.  She is advised to continue taking her allergy medications daily. She should reduce the use of fluticasone, titrating off as tolerated, and continue using Singulair (montelukast) as prescribed.          Return if symptoms worsen or fail to improve.    Please note that this dictation was created using voice recognition software. I have made every reasonable attempt to correct obvious errors, but I expect that there are errors of grammar and possibly content that I did not discover before finalizing the note.

## 2025-03-08 ENCOUNTER — HOSPITAL ENCOUNTER (OUTPATIENT)
Dept: LAB | Facility: MEDICAL CENTER | Age: 30
End: 2025-03-08
Attending: COMMUNITY BASED RESIDENTIAL TREATMENT FACILITY, MENTAL ILLNESS
Payer: MEDICAID

## 2025-03-08 LAB
25(OH)D3 SERPL-MCNC: 35 NG/ML (ref 30–100)
ALBUMIN SERPL BCP-MCNC: 4.2 G/DL (ref 3.2–4.9)
ALBUMIN/GLOB SERPL: 1.3 G/DL
ALP SERPL-CCNC: 70 U/L (ref 30–99)
ALT SERPL-CCNC: 13 U/L (ref 2–50)
ANION GAP SERPL CALC-SCNC: 14 MMOL/L (ref 7–16)
AST SERPL-CCNC: 20 U/L (ref 12–45)
BASOPHILS # BLD AUTO: 0.8 % (ref 0–1.8)
BASOPHILS # BLD: 0.08 K/UL (ref 0–0.12)
BILIRUB SERPL-MCNC: 0.5 MG/DL (ref 0.1–1.5)
BUN SERPL-MCNC: 11 MG/DL (ref 8–22)
CALCIUM ALBUM COR SERPL-MCNC: 9.5 MG/DL (ref 8.5–10.5)
CALCIUM SERPL-MCNC: 9.7 MG/DL (ref 8.5–10.5)
CHLORIDE SERPL-SCNC: 103 MMOL/L (ref 96–112)
CHOLEST SERPL-MCNC: 249 MG/DL (ref 100–199)
CO2 SERPL-SCNC: 23 MMOL/L (ref 20–33)
CREAT SERPL-MCNC: 1.01 MG/DL (ref 0.5–1.4)
EOSINOPHIL # BLD AUTO: 0.24 K/UL (ref 0–0.51)
EOSINOPHIL NFR BLD: 2.4 % (ref 0–6.9)
ERYTHROCYTE [DISTWIDTH] IN BLOOD BY AUTOMATED COUNT: 40.6 FL (ref 35.9–50)
EST. AVERAGE GLUCOSE BLD GHB EST-MCNC: 111 MG/DL
FASTING STATUS PATIENT QL REPORTED: NORMAL
FERRITIN SERPL-MCNC: 160 NG/ML (ref 10–291)
GFR SERPLBLD CREATININE-BSD FMLA CKD-EPI: 76 ML/MIN/1.73 M 2
GLOBULIN SER CALC-MCNC: 3.3 G/DL (ref 1.9–3.5)
GLUCOSE SERPL-MCNC: 103 MG/DL (ref 65–99)
HBA1C MFR BLD: 5.5 % (ref 4–5.6)
HCT VFR BLD AUTO: 46.1 % (ref 37–47)
HDLC SERPL-MCNC: 40 MG/DL
HGB BLD-MCNC: 15.5 G/DL (ref 12–16)
IMM GRANULOCYTES # BLD AUTO: 0.03 K/UL (ref 0–0.11)
IMM GRANULOCYTES NFR BLD AUTO: 0.3 % (ref 0–0.9)
IRON SATN MFR SERPL: 25 % (ref 15–55)
IRON SERPL-MCNC: 79 UG/DL (ref 40–170)
LDLC SERPL CALC-MCNC: 170 MG/DL
LYMPHOCYTES # BLD AUTO: 2.12 K/UL (ref 1–4.8)
LYMPHOCYTES NFR BLD: 21.1 % (ref 22–41)
MCH RBC QN AUTO: 30 PG (ref 27–33)
MCHC RBC AUTO-ENTMCNC: 33.6 G/DL (ref 32.2–35.5)
MCV RBC AUTO: 89.3 FL (ref 81.4–97.8)
MONOCYTES # BLD AUTO: 0.68 K/UL (ref 0–0.85)
MONOCYTES NFR BLD AUTO: 6.8 % (ref 0–13.4)
NEUTROPHILS # BLD AUTO: 6.9 K/UL (ref 1.82–7.42)
NEUTROPHILS NFR BLD: 68.6 % (ref 44–72)
NRBC # BLD AUTO: 0 K/UL
NRBC BLD-RTO: 0 /100 WBC (ref 0–0.2)
PLATELET # BLD AUTO: 525 K/UL (ref 164–446)
PMV BLD AUTO: 10.3 FL (ref 9–12.9)
POTASSIUM SERPL-SCNC: 4.6 MMOL/L (ref 3.6–5.5)
PROT SERPL-MCNC: 7.5 G/DL (ref 6–8.2)
RBC # BLD AUTO: 5.16 M/UL (ref 4.2–5.4)
SODIUM SERPL-SCNC: 140 MMOL/L (ref 135–145)
T3FREE SERPL-MCNC: 3.07 PG/ML (ref 2–4.4)
T4 FREE SERPL-MCNC: 1.37 NG/DL (ref 0.93–1.7)
TIBC SERPL-MCNC: 314 UG/DL (ref 250–450)
TRIGL SERPL-MCNC: 193 MG/DL (ref 0–149)
TSH SERPL-ACNC: 1.2 UIU/ML (ref 0.35–5.5)
UIBC SERPL-MCNC: 235 UG/DL (ref 110–370)
VIT B12 SERPL-MCNC: 211 PG/ML (ref 211–911)
WBC # BLD AUTO: 10.1 K/UL (ref 4.8–10.8)

## 2025-03-08 PROCEDURE — 80061 LIPID PANEL: CPT

## 2025-03-08 PROCEDURE — 83036 HEMOGLOBIN GLYCOSYLATED A1C: CPT

## 2025-03-08 PROCEDURE — 83540 ASSAY OF IRON: CPT

## 2025-03-08 PROCEDURE — 84443 ASSAY THYROID STIM HORMONE: CPT

## 2025-03-08 PROCEDURE — 84481 FREE ASSAY (FT-3): CPT

## 2025-03-08 PROCEDURE — 85025 COMPLETE CBC W/AUTO DIFF WBC: CPT

## 2025-03-08 PROCEDURE — 80053 COMPREHEN METABOLIC PANEL: CPT

## 2025-03-08 PROCEDURE — 84439 ASSAY OF FREE THYROXINE: CPT

## 2025-03-08 PROCEDURE — 82607 VITAMIN B-12: CPT

## 2025-03-08 PROCEDURE — 83550 IRON BINDING TEST: CPT

## 2025-03-08 PROCEDURE — 84425 ASSAY OF VITAMIN B-1: CPT

## 2025-03-08 PROCEDURE — 36415 COLL VENOUS BLD VENIPUNCTURE: CPT

## 2025-03-08 PROCEDURE — 82728 ASSAY OF FERRITIN: CPT

## 2025-03-08 PROCEDURE — 82306 VITAMIN D 25 HYDROXY: CPT

## 2025-03-12 LAB — VIT B1 BLD-MCNC: 99 NMOL/L (ref 70–180)

## 2025-03-30 DIAGNOSIS — J02.9 PHARYNGITIS, UNSPECIFIED ETIOLOGY: ICD-10-CM

## 2025-03-30 DIAGNOSIS — J45.40 MODERATE PERSISTENT ASTHMA WITHOUT COMPLICATION: ICD-10-CM

## 2025-03-30 DIAGNOSIS — K21.9 GASTROESOPHAGEAL REFLUX DISEASE WITHOUT ESOPHAGITIS: ICD-10-CM

## 2025-03-31 NOTE — TELEPHONE ENCOUNTER
Received request via: Pharmacy    Was the patient seen in the last year in this department? Yes    Does the patient have an active prescription (recently filled or refills available) for medication(s) requested? No    Pharmacy Name: [facility]    Does the patient have FPC Plus and need 100-day supply? (This applies to ALL medications) Patient does not have SCP

## 2025-04-01 RX ORDER — OMEPRAZOLE 20 MG/1
20 CAPSULE, DELAYED RELEASE ORAL DAILY
Qty: 30 CAPSULE | Refills: 2 | Status: SHIPPED | OUTPATIENT
Start: 2025-04-01

## 2025-04-01 RX ORDER — SUCRALFATE 1 G/1
1 TABLET ORAL
Qty: 90 TABLET | Refills: 0 | Status: SHIPPED | OUTPATIENT
Start: 2025-04-01

## 2025-04-01 RX ORDER — MONTELUKAST SODIUM 10 MG/1
10 TABLET ORAL DAILY
Qty: 30 TABLET | Refills: 2 | Status: SHIPPED | OUTPATIENT
Start: 2025-04-01

## 2025-04-15 ENCOUNTER — HOSPITAL ENCOUNTER (EMERGENCY)
Facility: MEDICAL CENTER | Age: 30
End: 2025-04-15
Attending: EMERGENCY MEDICINE
Payer: MEDICAID

## 2025-04-15 ENCOUNTER — OFFICE VISIT (OUTPATIENT)
Dept: URGENT CARE | Facility: PHYSICIAN GROUP | Age: 30
End: 2025-04-15
Payer: MEDICAID

## 2025-04-15 ENCOUNTER — PHARMACY VISIT (OUTPATIENT)
Dept: PHARMACY | Facility: MEDICAL CENTER | Age: 30
End: 2025-04-15
Payer: COMMERCIAL

## 2025-04-15 ENCOUNTER — APPOINTMENT (OUTPATIENT)
Dept: RADIOLOGY | Facility: MEDICAL CENTER | Age: 30
End: 2025-04-15
Attending: EMERGENCY MEDICINE
Payer: MEDICAID

## 2025-04-15 VITALS
OXYGEN SATURATION: 98 % | HEIGHT: 71 IN | RESPIRATION RATE: 18 BRPM | SYSTOLIC BLOOD PRESSURE: 146 MMHG | DIASTOLIC BLOOD PRESSURE: 80 MMHG | WEIGHT: 206 LBS | BODY MASS INDEX: 28.84 KG/M2 | TEMPERATURE: 98.6 F

## 2025-04-15 VITALS
OXYGEN SATURATION: 95 % | SYSTOLIC BLOOD PRESSURE: 96 MMHG | DIASTOLIC BLOOD PRESSURE: 59 MMHG | HEART RATE: 91 BPM | WEIGHT: 206 LBS | TEMPERATURE: 96 F | HEIGHT: 67 IN | BODY MASS INDEX: 32.33 KG/M2 | RESPIRATION RATE: 12 BRPM

## 2025-04-15 DIAGNOSIS — R10.2 PELVIC PAIN: ICD-10-CM

## 2025-04-15 DIAGNOSIS — K52.9 COLITIS: ICD-10-CM

## 2025-04-15 DIAGNOSIS — D72.829 LEUKOCYTOSIS, UNSPECIFIED TYPE: ICD-10-CM

## 2025-04-15 DIAGNOSIS — R10.31 RLQ ABDOMINAL PAIN: ICD-10-CM

## 2025-04-15 LAB
ALBUMIN SERPL BCP-MCNC: 4.9 G/DL (ref 3.2–4.9)
ALBUMIN/GLOB SERPL: 1.3 G/DL
ALP SERPL-CCNC: 120 U/L (ref 30–99)
ALT SERPL-CCNC: 20 U/L (ref 2–50)
ANION GAP SERPL CALC-SCNC: 21 MMOL/L (ref 7–16)
APPEARANCE UR: CLEAR
AST SERPL-CCNC: 26 U/L (ref 12–45)
BACTERIA #/AREA URNS HPF: ABNORMAL /HPF
BASOPHILS # BLD AUTO: 0.5 % (ref 0–1.8)
BASOPHILS # BLD: 0.11 K/UL (ref 0–0.12)
BILIRUB SERPL-MCNC: 0.7 MG/DL (ref 0.1–1.5)
BILIRUB UR QL STRIP.AUTO: NEGATIVE
BUN SERPL-MCNC: 12 MG/DL (ref 8–22)
CALCIUM ALBUM COR SERPL-MCNC: 9.7 MG/DL (ref 8.5–10.5)
CALCIUM SERPL-MCNC: 10.4 MG/DL (ref 8.5–10.5)
CASTS URNS QL MICRO: ABNORMAL /LPF (ref 0–2)
CHLORIDE SERPL-SCNC: 104 MMOL/L (ref 96–112)
CO2 SERPL-SCNC: 18 MMOL/L (ref 20–33)
COLOR UR: YELLOW
CREAT SERPL-MCNC: 1.26 MG/DL (ref 0.5–1.4)
EOSINOPHIL # BLD AUTO: 0.09 K/UL (ref 0–0.51)
EOSINOPHIL NFR BLD: 0.4 % (ref 0–6.9)
EPITHELIAL CELLS 1715: ABNORMAL /HPF (ref 0–5)
ERYTHROCYTE [DISTWIDTH] IN BLOOD BY AUTOMATED COUNT: 41.3 FL (ref 35.9–50)
GFR SERPLBLD CREATININE-BSD FMLA CKD-EPI: 58 ML/MIN/1.73 M 2
GLOBULIN SER CALC-MCNC: 3.9 G/DL (ref 1.9–3.5)
GLUCOSE SERPL-MCNC: 116 MG/DL (ref 65–99)
GLUCOSE UR STRIP.AUTO-MCNC: NEGATIVE MG/DL
HCG SERPL QL: NEGATIVE
HCT VFR BLD AUTO: 47.4 % (ref 37–47)
HGB BLD-MCNC: 16.8 G/DL (ref 12–16)
IMM GRANULOCYTES # BLD AUTO: 0.11 K/UL (ref 0–0.11)
IMM GRANULOCYTES NFR BLD AUTO: 0.5 % (ref 0–0.9)
KETONES UR STRIP.AUTO-MCNC: 40 MG/DL
LEUKOCYTE ESTERASE UR QL STRIP.AUTO: NEGATIVE
LIPASE SERPL-CCNC: 19 U/L (ref 11–82)
LYMPHOCYTES # BLD AUTO: 2.1 K/UL (ref 1–4.8)
LYMPHOCYTES NFR BLD: 10.2 % (ref 22–41)
MCH RBC QN AUTO: 31.2 PG (ref 27–33)
MCHC RBC AUTO-ENTMCNC: 35.4 G/DL (ref 32.2–35.5)
MCV RBC AUTO: 87.9 FL (ref 81.4–97.8)
MICRO URNS: ABNORMAL
MONOCYTES # BLD AUTO: 0.67 K/UL (ref 0–0.85)
MONOCYTES NFR BLD AUTO: 3.2 % (ref 0–13.4)
NEUTROPHILS # BLD AUTO: 17.57 K/UL (ref 1.82–7.42)
NEUTROPHILS NFR BLD: 85.2 % (ref 44–72)
NITRITE UR QL STRIP.AUTO: NEGATIVE
NRBC # BLD AUTO: 0 K/UL
NRBC BLD-RTO: 0 /100 WBC (ref 0–0.2)
PH UR STRIP.AUTO: 5 [PH] (ref 5–8)
PLATELET # BLD AUTO: 664 K/UL (ref 164–446)
PMV BLD AUTO: 10.2 FL (ref 9–12.9)
POTASSIUM SERPL-SCNC: 4.2 MMOL/L (ref 3.6–5.5)
PROT SERPL-MCNC: 8.8 G/DL (ref 6–8.2)
PROT UR QL STRIP: NEGATIVE MG/DL
RBC # BLD AUTO: 5.39 M/UL (ref 4.2–5.4)
RBC # URNS HPF: ABNORMAL /HPF (ref 0–2)
RBC UR QL AUTO: ABNORMAL
SODIUM SERPL-SCNC: 143 MMOL/L (ref 135–145)
SP GR UR STRIP.AUTO: >1.045
UROBILINOGEN UR STRIP.AUTO-MCNC: 1 EU/DL
WBC # BLD AUTO: 20.7 K/UL (ref 4.8–10.8)
WBC #/AREA URNS HPF: ABNORMAL /HPF

## 2025-04-15 PROCEDURE — 3077F SYST BP >= 140 MM HG: CPT | Performed by: FAMILY MEDICINE

## 2025-04-15 PROCEDURE — 3079F DIAST BP 80-89 MM HG: CPT | Performed by: FAMILY MEDICINE

## 2025-04-15 PROCEDURE — 96374 THER/PROPH/DIAG INJ IV PUSH: CPT | Mod: XU

## 2025-04-15 PROCEDURE — RXMED WILLOW AMBULATORY MEDICATION CHARGE: Performed by: EMERGENCY MEDICINE

## 2025-04-15 PROCEDURE — 81001 URINALYSIS AUTO W/SCOPE: CPT

## 2025-04-15 PROCEDURE — 74177 CT ABD & PELVIS W/CONTRAST: CPT

## 2025-04-15 PROCEDURE — 700111 HCHG RX REV CODE 636 W/ 250 OVERRIDE (IP): Mod: UD | Performed by: EMERGENCY MEDICINE

## 2025-04-15 PROCEDURE — 700105 HCHG RX REV CODE 258: Mod: UD | Performed by: EMERGENCY MEDICINE

## 2025-04-15 PROCEDURE — 99285 EMERGENCY DEPT VISIT HI MDM: CPT

## 2025-04-15 PROCEDURE — 80053 COMPREHEN METABOLIC PANEL: CPT

## 2025-04-15 PROCEDURE — 84703 CHORIONIC GONADOTROPIN ASSAY: CPT

## 2025-04-15 PROCEDURE — 36415 COLL VENOUS BLD VENIPUNCTURE: CPT

## 2025-04-15 PROCEDURE — 83690 ASSAY OF LIPASE: CPT

## 2025-04-15 PROCEDURE — 96375 TX/PRO/DX INJ NEW DRUG ADDON: CPT

## 2025-04-15 PROCEDURE — 85025 COMPLETE CBC W/AUTO DIFF WBC: CPT

## 2025-04-15 PROCEDURE — 96376 TX/PRO/DX INJ SAME DRUG ADON: CPT

## 2025-04-15 PROCEDURE — 99215 OFFICE O/P EST HI 40 MIN: CPT | Performed by: FAMILY MEDICINE

## 2025-04-15 PROCEDURE — 700117 HCHG RX CONTRAST REV CODE 255: Mod: UD | Performed by: EMERGENCY MEDICINE

## 2025-04-15 RX ORDER — METRONIDAZOLE 500 MG/1
500 TABLET ORAL 3 TIMES DAILY
Qty: 21 TABLET | Refills: 0 | Status: ACTIVE | OUTPATIENT
Start: 2025-04-15 | End: 2025-04-22

## 2025-04-15 RX ORDER — CIPROFLOXACIN 500 MG/1
500 TABLET, FILM COATED ORAL 2 TIMES DAILY
Qty: 14 TABLET | Refills: 0 | Status: ACTIVE | OUTPATIENT
Start: 2025-04-15 | End: 2025-04-22

## 2025-04-15 RX ORDER — MORPHINE SULFATE 4 MG/ML
4 INJECTION INTRAVENOUS
Status: COMPLETED | OUTPATIENT
Start: 2025-04-15 | End: 2025-04-15

## 2025-04-15 RX ORDER — SODIUM CHLORIDE 9 MG/ML
1000 INJECTION, SOLUTION INTRAVENOUS ONCE
Status: COMPLETED | OUTPATIENT
Start: 2025-04-15 | End: 2025-04-15

## 2025-04-15 RX ORDER — ONDANSETRON 2 MG/ML
4 INJECTION INTRAMUSCULAR; INTRAVENOUS ONCE
Status: COMPLETED | OUTPATIENT
Start: 2025-04-15 | End: 2025-04-15

## 2025-04-15 RX ORDER — MORPHINE SULFATE 4 MG/ML
4 INJECTION INTRAVENOUS ONCE
Status: DISCONTINUED | OUTPATIENT
Start: 2025-04-15 | End: 2025-04-15 | Stop reason: HOSPADM

## 2025-04-15 RX ORDER — HYDROCODONE BITARTRATE AND ACETAMINOPHEN 5; 325 MG/1; MG/1
1 TABLET ORAL EVERY 6 HOURS PRN
Qty: 10 TABLET | Refills: 0 | Status: SHIPPED | OUTPATIENT
Start: 2025-04-15 | End: 2025-04-18

## 2025-04-15 RX ADMIN — MORPHINE SULFATE 4 MG: 4 INJECTION INTRAVENOUS at 15:10

## 2025-04-15 RX ADMIN — MORPHINE SULFATE 4 MG: 4 INJECTION INTRAVENOUS at 16:31

## 2025-04-15 RX ADMIN — MORPHINE SULFATE 4 MG: 4 INJECTION INTRAVENOUS at 20:15

## 2025-04-15 RX ADMIN — IOHEXOL 97 ML: 350 INJECTION, SOLUTION INTRAVENOUS at 17:30

## 2025-04-15 RX ADMIN — SODIUM CHLORIDE 1000 ML: 9 INJECTION, SOLUTION INTRAVENOUS at 15:10

## 2025-04-15 RX ADMIN — ONDANSETRON 4 MG: 2 INJECTION INTRAMUSCULAR; INTRAVENOUS at 15:11

## 2025-04-15 ASSESSMENT — PAIN DESCRIPTION - PAIN TYPE
TYPE: ACUTE PAIN

## 2025-04-15 ASSESSMENT — FIBROSIS 4 INDEX
FIB4 SCORE: 0.34
FIB4 SCORE: 0.34

## 2025-04-15 NOTE — ED TRIAGE NOTES
"Pt BIBA via [facility].     Chief Complaint   Patient presents with    Abdominal Pain     Pt complains of RLQ pain that began today. Pt states that they took 50mg Tramadol at home and received 15mg IM Toradol en route per EMS.      BP (!) 149/78   Pulse (!) 54   Temp (!) 35.6 °C (96 °F) (Temporal)   Resp 16   Ht 1.702 m (5' 7\")   Wt 93.4 kg (206 lb)   SpO2 100%   BMI 32.26 kg/m²     "

## 2025-04-15 NOTE — PROGRESS NOTES
"Subjective:     Chief Complaint   Patient presents with    RLQ Pain     Pt states she has intense lower right abdominal pain. Vomiting and shakes. Hot and cold flashes. Started early this morning 10/10 pain. Possible ovary issue?               HPI:          pt complains of ACUTE onset  pelvic pain started today, along with n/v.   Pain sharp, stabbing 10/10.             No fever          Past Medical History:   Diagnosis Date    GERD (gastroesophageal reflux disease) 03/2023    Need for HPV vaccination 08/05/2016    Acute low back pain 08/20/2014    X 1 week Cramping, suprpubic, L=R Pain in low back, most of the time 10/10 at it's worst 5/10 at it's best Tried ?asa, one time, before work - didn't help No BM changes Periods usu 4-7 days, usu once/month About 2 weeks ago, had one day of bleeding Sexually active, use condoms 100% of the time, one broke around 8/4    Anxiety     ASTHMA     Depression     on OCPs    Environmental allergies     mold, dust, grass    Migraine     Syncopes, vasovagal          Social History     Tobacco Use    Smoking status: Never    Smokeless tobacco: Never   Vaping Use    Vaping status: Never Used   Substance Use Topics    Alcohol use: Not Currently     Comment: occ    Drug use: Yes     Types: Marijuana     Comment: gummies           Review of Systems   Constitutional: Negative for chills.   HENT: Negative for congestion.    Respiratory: Negative for cough.    Gastrointestinal: Positive for abdominal pain.   Genitourinary: Positive for pelvic pain.   Neurological: Negative for headaches.   All other systems reviewed and are negative.         Objective:     BP (!) 146/80   Temp 37 °C (98.6 °F) (Temporal)   Resp 18   Ht 1.803 m (5' 11\")   Wt 93.4 kg (206 lb)   SpO2 98%       Physical Exam   Constitutional:  she was found in exam room, on table, in fetal position, crying in pain, clutching rt abdomen  HENT:   Head: Normocephalic and atraumatic.   Eyes: Conjunctivae are normal. "   Cardiovascular: Normal rate, regular rhythm and normal heart sounds.    No murmur heard.  Pulmonary/Chest: Effort normal and breath sounds normal. No respiratory distress. She has no wheezes.   Abdominal: + RLQ TTP.    No rebound.   + guarding.    BS decreased all quads.       Neurological: She is alert and oriented to person, place, and time. No cranial nerve deficit.   Skin: Skin is warm. She is not diaphoretic. No erythema.   Nursing note and vitals reviewed.              Assessment/Plan:        1. Pelvic pain     Concern for ruptured ovarian cyst vs appendicitis    Will tx to [facility] ED via EMS    Report called tx and and report also given to EMS    Of note, she had a near syncopal episode while in exam room.    No seizure activity, no post-ictal state.    No incontinence or tongue biting.   VS all within normal ranges

## 2025-04-15 NOTE — ED PROVIDER NOTES
ED Provider Note    CHIEF COMPLAINT  Chief Complaint   Patient presents with    Abdominal Pain     Pt complains of RLQ pain that began today. Pt states that they took 50mg Tramadol at home and received 15mg IM Toradol en route per EMS.        EXTERNAL RECORDS REVIEWED  Outpatient Notes the patient had been seen for concerns of intense right lower abdominal discomfort in the outpatient setting.  There was vomiting and perceived chills that had started late this morning.  There is no prior episodes, she is on OCPs and identifies as male.  Send the emergency room for concern regarding ruptured ovarian cyst versus appendectomy versus ectopic    HPI/ROS  LIMITATION TO HISTORY   Select: : None  OUTSIDE HISTORIAN(S):  Parent mother at bedside    Kirsty Landry is a 30 y.o. biological female who identifies as male with a history of ovarian cysts who presents to the emergency room for evaluation of sudden onset right lower quadrant pain.  This started about 1130, she had nausea and vomiting secondary to this pain, has had issues with ovarian cysts in the past and believes they were on the same side.  She had some preceding cramping sensations feeling like she was may be starting her period and was concerned about this.  Her pain escalated and she is denying any concurrent urinary symptoms, no bowel changes, no vaginal bleeding and she currently has a boyfriend that is away in [place] and has not been sexually active for some time.  She had no concerning symptoms such as vaginal discharge or concerns for any active STDs.  There is a strong history of ovarian cysts that are large in her family.  She has not been identified with ovarian torsion.  She was concerned after being seen in the outpatient clinic for possible appendicitis versus ovarian cyst.  She is currently complaining of 9 out of 10 pain, localizing to the right lower quadrant with some radiation to the back.  No recent fevers or chills, only prior surgical  history is prior cholecystectomy.    PAST MEDICAL HISTORY   has a past medical history of Acute low back pain (08/20/2014), Anxiety, ASTHMA, Depression, Environmental allergies, GERD (gastroesophageal reflux disease) (03/2023), Migraine, Need for HPV vaccination (08/05/2016), and Syncopes, vasovagal.    SURGICAL HISTORY   has a past surgical history that includes cholecystectomy.    FAMILY HISTORY  Family History   Problem Relation Age of Onset    Diabetes Mother     Heart Disease Mother         MI- age 46, 39 yo    Diabetes Maternal Grandmother     Heart Disease Maternal Grandmother     Hypertension Maternal Grandmother     Stroke Maternal Grandmother     Diabetes Maternal Grandfather     Heart Disease Maternal Grandfather     Hypertension Maternal Grandfather     Stroke Maternal Grandfather     Hypertension Father     Hyperlipidemia Father     Cancer Paternal Grandfather      SOCIAL HISTORY  Social History     Tobacco Use    Smoking status: Never    Smokeless tobacco: Never   Vaping Use    Vaping status: Never Used   Substance and Sexual Activity    Alcohol use: Not Currently     Comment: occ    Drug use: Yes     Types: Marijuana     Comment: gummies    Sexual activity: Not Currently     Partners: Male     CURRENT MEDICATIONS  Home Medications       Reviewed by Nita Castañeda R.N. (Registered Nurse) on 04/15/25 at 1416  Med List Status: <None>     Medication Last Dose Status   asa/apap/caffeine (EXCEDRIN) 250-250-65 MG Tab  Active   DULoxetine (CYMBALTA) 60 MG Cap DR Particles delayed-release capsule  Active   ergocalciferol (DRISDOL) 76961 UNIT capsule  Active   fluticasone (FLOVENT HFA) 110 MCG/ACT Aerosol  Active   hydrOXYzine HCl (ATARAX) 25 MG Tab  Active   Ibuprofen 200 MG Cap  Active   montelukast (SINGULAIR) 10 MG Tab  Active   norelgestromin-ethinyl estradiol (ORTHO EVRA) 150-35 MCG/24HR patch  Active   omeprazole (PRILOSEC) 20 MG delayed-release capsule  Active   propranolol (INDERAL) 10 MG Tab  Active  "  Saline Spray 0.65 % Solution  Active   sucralfate (CARAFATE) 1 GM Tab  Active                  ALLERGIES  Allergies   Allergen Reactions    Cillins [Penicillins]     Codeine     Sulfa Drugs      PHYSICAL EXAM  VITAL SIGNS: BP (!) 149/78   Pulse (!) 54   Temp (!) 35.6 °C (96 °F) (Temporal)   Resp 16   Ht 1.702 m (5' 7\")   Wt 93.4 kg (206 lb)   SpO2 100%   BMI 32.26 kg/m²    Genl: F sitting in gurney uncomfortably, speaking clearly, appears in moderate distress   Head: NC/AT   ENT: Mucous membranes moist, posterior pharynx clear, uvula midline, nares patent bilaterally   Pulmonary: Lungs are clear to auscultation bilaterally  Chest: No TTP  CV:  RRR, no murmur appreciated  Abdomen: soft, right lower quadrant intermittent pain and discomfort, somewhat inconsistent.  No pain with bilateral straight leg raise.  ND; no left lower quadrant or right upper quadrant rebound/guarding, no masses palpated, no HSM   : no CVA or suprapubic tenderness   Musculoskeletal: Pain free ROM of the neck. Moving upper and lower extremities in spontaneous and coordinated fashion  Neuro: A&Ox4 (person, place, time, situation), speech fluent, gait steady, no focal deficits appreciated, No cerebellar signs. Sensation is grossly intact in the distal upper and lower extremities.  5/5 strength in  and dorsiflexion/plantar flexion of the ankles  Psych: Patient has an appropriate affect and behavior  Skin: No rash or lesions.  No pallor or jaundice.  No cyanosis.  Warm and dry.     EKG/LABS  Labs Reviewed   CBC WITH DIFFERENTIAL - Abnormal; Notable for the following components:       Result Value    WBC 20.7 (*)     Hemoglobin 16.8 (*)     Hematocrit 47.4 (*)     Platelet Count 664 (*)     Neutrophils-Polys 85.20 (*)     Lymphocytes 10.20 (*)     Neutrophils (Absolute) 17.57 (*)     All other components within normal limits   COMP METABOLIC PANEL - Abnormal; Notable for the following components:    Co2 18 (*)     Anion Gap 21.0 (*)  "    Glucose 116 (*)     Alkaline Phosphatase 120 (*)     Total Protein 8.8 (*)     Globulin 3.9 (*)     All other components within normal limits   ESTIMATED GFR - Abnormal; Notable for the following components:    GFR (CKD-EPI) 58 (*)     All other components within normal limits   HCG QUAL SERUM   LIPASE   URINALYSIS,CULTURE IF INDICATED   COD (ADULT)     RADIOLOGY/PROCEDURES   I have independently interpreted the diagnostic imaging associated with this visit and am waiting the final reading from the radiologist.   My preliminary interpretation is as follows: Bedside POCUS shows no free fluid in the pelvis, CT abdomen pelvis is pending  Radiologist interpretation:  CT-ABDOMEN-PELVIS WITH    (Results Pending)     Point of Care Ultrasound    ED POINT OF CARE ULTRASOUND: EFAST EXAM  Limited Diagnostic Exam: Limited Abdomen, Limited Chest, Limited Cardiac   49103-09, [id number], [id number]  Patient Identity confirmed: Yes  Indication for exam: concern for abd pain, r/o ectopic rupture    Hepatorenal (RUQ) Free Fluid:not present  Perisplenic (LUQ) Free Fluid:not present  Suprapubic Free Fluid:not present  Pericardial Effusion: not present  Images retained through [facility] as seen below: (One Cardiac, RUQ,LUQ,Suprapubic, and Pleura)       Impression: No observable large amount of free fluid in the pelvis    This study is a limited ultrasound examination performed and interpreted to evaluate for limited conditions as outlined above. There may be other clinically important information contained in the images that is outside this scope. When clinically warranted, a comprehensive ultrasound through the appropriate department is considered.    COURSE & MEDICAL DECISION MAKING    ASSESSMENT, COURSE AND PLAN  Care Narrative: Patient is seen evaluated for symptoms as described above.  The patient presented with sudden onset localizing right lower quadrant discomfort, has a prior history of ovarian cyst, did not get immediate relief  with Toradol and tramadol and on arrival is having some tachypnea and is able to be redirected with some breathing techniques.  She has a slightly lower temperature, she is bradycardic, she continues to have some localizing tenderness and concern be for appendectomy versus ovarian cyst versus ectopic though she claims that she has not pregnant.  IV access established, labs obtained and pregnancy test thankfully is negative, she has no CHELO, she has a subtle increase in her anion gap with gap of 21, bicarb of 18, leukocytosis of 20 with some hemoconcentration.  Fluids are started, she is made NPO.  Plan is for urinalysis and CT abdomen pelvis.    At the time of signout, patient is pending CT abdomen pelvis and ultrasound.  Patient will be followed up by my colleague at the time of signout for likely new onset appendectomy versus ruptured ovarian cyst.  Please see the note for follow-up regarding the patient's status or need for admission or surgical intervention.    In ED observation status at 1:45 PM is anticipate that patient will need to have signout, CT and possible ultrasound follow-up.  Currently receiving pain medications and awaiting medical imaging.    Hydration: Based on the patient's presentation of Other npo status the patient was given IV fluids. IV Hydration was used because oral hydration was not adequate alone. Upon recheck following hydration, the patient was improving.    FINAL DIAGNOSIS  1. RLQ abdominal pain    2. Leukocytosis, unspecified type      Electronically signed by: Héctor Dominguez M.D., 4/15/2025 2:18 PM

## 2025-04-15 NOTE — ED NOTES
Pt reports they were unable to collect UA. Pt voided into toilet before being able to collected it.     Pt educated on need for UA when able to go. IVFs continued

## 2025-04-16 NOTE — ED NOTES
Patient remains comfortable on gurney, no identifiable needs at this time. Equal chest rise and fall bilaterally, pt connected to cardiac monitor. Safety measures in place, call light within reach.   Mother at bedside

## 2025-04-16 NOTE — DISCHARGE SUMMARY
"  ED Observation Discharge Summary    Patient:Kirsty Landry  Patient : 1995  Patient MRN: [id number]  Patient PCP: RAJIV Harley    Admit Date: 4/15/2025  Discharge Date and Time: 04/15/25 7:03 PM  Discharge Diagnosis: .  1. RLQ abdominal pain  HYDROcodone-acetaminophen (NORCO) 5-325 MG Tab per tablet      2. Leukocytosis, unspecified type        3. Colitis  ciprofloxacin (CIPRO) 500 MG Tab    metroNIDAZOLE (FLAGYL) 500 MG Tab          Discharge Attending: Prakash Al M.D.  Discharge Service: ED Observation    ED Course  Kirsty is a 30 y.o. person who was evaluated at [facility] for evaluation of right lower quadrant abdominal pain.  The patient was initially evaluated by Dr. Dominguez please refer to his dictation for initial workup.  Patient does have a significant leukocytosis of 20,000.  CAT scan does show some mild inflammatory changes around the ureter but urine only shows red blood cells no white blood cells so I do not feel it is an infectious etiology at that point however she does have what appears to be ascending thickness to her ascending colon could be compression versus colitis.  Patient states she is always had a leukocytosis however at 20,000 with pain in that right lower quadrant could very well be colitis.  At this point the patient has a significant allergy to penicillin so I will do Cipro Flagyl for the treatment of this after consulting with pharmacy.  The patient does have a GI specialist as well as a primary care physician.  She has a primary care appointment tomorrow.  I recommended to follow-up tomorrow for recheck however if she has any worsening symptoms she should return back to the Emergency Department for reevaluation.    Discharge Exam:  /59   Pulse 88   Temp (!) 35.6 °C (96 °F) (Temporal)   Resp 20   Ht 1.702 m (5' 7\")   Wt 93.4 kg (206 lb)   SpO2 95%   BMI 32.26 kg/m² .    Constitutional: Awake and alert. Nontoxic  Abdomen: Still some mild " tenderness within the right lower quadrant region.  Psychiatric: Affect normal    Labs  Results for orders placed or performed during the hospital encounter of 04/15/25   HCG QUAL SERUM    Collection Time: 04/15/25  3:01 PM   Result Value Ref Range    Beta-Hcg Qualitative Serum Negative Negative   CBC WITH DIFFERENTIAL    Collection Time: 04/15/25  3:01 PM   Result Value Ref Range    WBC 20.7 (H) 4.8 - 10.8 K/uL    RBC 5.39 4.20 - 5.40 M/uL    Hemoglobin 16.8 (H) 12.0 - 16.0 g/dL    Hematocrit 47.4 (H) 37.0 - 47.0 %    MCV 87.9 81.4 - 97.8 fL    MCH 31.2 27.0 - 33.0 pg    MCHC 35.4 32.2 - 35.5 g/dL    RDW 41.3 35.9 - 50.0 fL    Platelet Count 664 (H) 164 - 446 K/uL    MPV 10.2 9.0 - 12.9 fL    Neutrophils-Polys 85.20 (H) 44.00 - 72.00 %    Lymphocytes 10.20 (L) 22.00 - 41.00 %    Monocytes 3.20 0.00 - 13.40 %    Eosinophils 0.40 0.00 - 6.90 %    Basophils 0.50 0.00 - 1.80 %    Immature Granulocytes 0.50 0.00 - 0.90 %    Nucleated RBC 0.00 0.00 - 0.20 /100 WBC    Neutrophils (Absolute) 17.57 (H) 1.82 - 7.42 K/uL    Lymphs (Absolute) 2.10 1.00 - 4.80 K/uL    Monos (Absolute) 0.67 0.00 - 0.85 K/uL    Eos (Absolute) 0.09 0.00 - 0.51 K/uL    Baso (Absolute) 0.11 0.00 - 0.12 K/uL    Immature Granulocytes (abs) 0.11 0.00 - 0.11 K/uL    NRBC (Absolute) 0.00 K/uL   COMP METABOLIC PANEL    Collection Time: 04/15/25  3:01 PM   Result Value Ref Range    Sodium 143 135 - 145 mmol/L    Potassium 4.2 3.6 - 5.5 mmol/L    Chloride 104 96 - 112 mmol/L    Co2 18 (L) 20 - 33 mmol/L    Anion Gap 21.0 (H) 7.0 - 16.0    Glucose 116 (H) 65 - 99 mg/dL    Bun 12 8 - 22 mg/dL    Creatinine 1.26 0.50 - 1.40 mg/dL    Calcium 10.4 8.5 - 10.5 mg/dL    Correct Calcium 9.7 8.5 - 10.5 mg/dL    AST(SGOT) 26 12 - 45 U/L    ALT(SGPT) 20 2 - 50 U/L    Alkaline Phosphatase 120 (H) 30 - 99 U/L    Total Bilirubin 0.7 0.1 - 1.5 mg/dL    Albumin 4.9 3.2 - 4.9 g/dL    Total Protein 8.8 (H) 6.0 - 8.2 g/dL    Globulin 3.9 (H) 1.9 - 3.5 g/dL    A-G Ratio 1.3  g/dL   LIPASE    Collection Time: 04/15/25  3:01 PM   Result Value Ref Range    Lipase 19 11 - 82 U/L   ESTIMATED GFR    Collection Time: 04/15/25  3:01 PM   Result Value Ref Range    GFR (CKD-EPI) 58 (A) >60 mL/min/1.73 m 2   URINALYSIS,CULTURE IF INDICATED    Collection Time: 04/15/25  5:53 PM    Specimen: Urine   Result Value Ref Range    Color Yellow     Character Clear     Specific Gravity >1.045 <1.035    Ph 5.0 5.0 - 8.0    Glucose Negative Negative mg/dL    Ketones 40 (A) Negative mg/dL    Protein Negative Negative mg/dL    Bilirubin Negative Negative    Urobilinogen, Urine 1.0 <=1.0 EU/dL    Nitrite Negative Negative    Leukocyte Esterase Negative Negative    Occult Blood Large (A) Negative    Micro Urine Req Microscopic    URINE MICROSCOPIC (W/UA)    Collection Time: 04/15/25  5:53 PM   Result Value Ref Range    WBC 0-2 /hpf    RBC 11-20 (A) 0 - 2 /hpf    Bacteria None Seen None /hpf    Epithelial Cells 0-2 0 - 5 /hpf    Urine Casts 0-2 0 - 2 /lpf       Radiology  CT-ABDOMEN-PELVIS WITH   Final Result      1.  Mild stranding in the proximal right ureter could relate to infection or inflammation. No hydronephrosis.   2.  Air-fluid level in the ascending colon with apparent mild wall thickening could relate to underdistention or mild colitis.   3.  Normal appendix.          Medications:   New Prescriptions    No medications on file       My final assessment includes   1. RLQ abdominal pain  HYDROcodone-acetaminophen (NORCO) 5-325 MG Tab per tablet      2. Leukocytosis, unspecified type        3. Colitis  ciprofloxacin (CIPRO) 500 MG Tab    metroNIDAZOLE (FLAGYL) 500 MG Tab          Upon Reevaluation, the patient's condition has: Improved; and will be discharged.    Patient discharged from ED Observation status at 2046 (Time) 04/15/25  (Date).     Total time spent on this ED Observation discharge encounter is > 30 Minutes    Electronically signed by: Prakash Al M.D., 4/15/2025 7:03 PM

## 2025-05-15 ENCOUNTER — OFFICE VISIT (OUTPATIENT)
Dept: MEDICAL GROUP | Facility: MEDICAL CENTER | Age: 30
End: 2025-05-15
Payer: MEDICAID

## 2025-05-15 VITALS
SYSTOLIC BLOOD PRESSURE: 98 MMHG | TEMPERATURE: 97.7 F | DIASTOLIC BLOOD PRESSURE: 70 MMHG | OXYGEN SATURATION: 98 % | BODY MASS INDEX: 32.2 KG/M2 | HEART RATE: 71 BPM | WEIGHT: 205.6 LBS

## 2025-05-15 DIAGNOSIS — K52.9 COLITIS: Primary | ICD-10-CM

## 2025-05-15 DIAGNOSIS — Z87.19 HISTORY OF COLITIS: ICD-10-CM

## 2025-05-15 PROCEDURE — 3074F SYST BP LT 130 MM HG: CPT

## 2025-05-15 PROCEDURE — 99212 OFFICE O/P EST SF 10 MIN: CPT

## 2025-05-15 PROCEDURE — 3078F DIAST BP <80 MM HG: CPT

## 2025-05-15 PROCEDURE — 99213 OFFICE O/P EST LOW 20 MIN: CPT

## 2025-05-15 ASSESSMENT — FIBROSIS 4 INDEX: FIB4 SCORE: 0.28

## 2025-05-15 NOTE — PROGRESS NOTES
Verbal consent was acquired by the patient to use [employer] ambient listening note generation during this visit     Subjective:     CC:  There were no encounter diagnoses.    HISTORY OF THE PRESENT ILLNESS: Patient is a 30 y.o. person.     History of Present Illness  The patient presents for evaluation of colitis.    Colitis  - Admitted to the emergency room 1 month ago due to a partial collapse of her colon, initially suspected to be a ruptured ovarian cyst.  - Diagnosis of colitis was made after ruling out cyst rupture and appendicitis.  - History of ovarian cysts and her mother had a history of ruptured cysts requiring a hysterectomy.  - Continues to experience symptoms of colitis upon awakening, including nausea.  - Reports persistent dull pain in the area, which is not alleviated by pain medication.  - Completed a course of antibiotics but is uncertain of their efficacy.  - Pain, while less severe than at onset, has radiated to her back.  - Recalls receiving 3 to 4 doses of morphine during her hospital stay, with the pain returning immediately upon wearing off of the medication.  - Has a scheduled appointment with a gastroenterologist today at 1:45 PM.    Urinary Tract  - CT scan revealed stranding in the right proximal ureter, but she reports no dysuria or CVA tenderness.  - History of urinary tract infections (UTIs) but currently reports no pain.    Back Pain  - Reports chronic back pain, which has been exacerbated by the colitis.  - Treated with a 2-week course of Cipro and Flagyl, along with analgesics.    Supplemental information: None    FAMILY HISTORY  - Mother has a history of ovarian cysts rupturing and requiring a hysterectomy    Health Maintenance: reviewed and completed per patient preference.     ROS:   PER HPI.           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   Tobacco Use    Smoking status: Never    Smokeless tobacco: Never   Vaping Use    Vaping status: Never Used   Substance and Sexual Activity    Alcohol use: Not Currently     Comment: occ    Drug use: Yes     Types: Marijuana     Comment: gummies    Sexual activity: Not Currently     Partners: Male   Other Topics Concern    Not on file   Social History Narrative    Lives with mother, family     Social Drivers of Health     Financial Resource Strain: Not on file   Food Insecurity: Not on file   Transportation Needs: Not on file   Physical Activity: Not on file   Stress: Not on file   Social Connections: Not on file   Intimate Partner Violence: Not on file   Housing Stability: Not on file      Allergies[1]         Current Outpatient Medications:     sucralfate, 1 g, Oral, TID AC    omeprazole, 20 mg, Oral, DAILY    montelukast, 10 mg, Oral, DAILY    propranolol, 10 mg, Oral, DAILY    DULoxetine, 60 mg, Oral, DAILY    Saline Spray, 1 Application, Nasal, Nightly    hydrOXYzine HCl, 25 mg, Oral, TID PRN    norelgestromin-ethinyl estradiol, 1 Patch, Transdermal, Q7 DAYS    ergocalciferol, 50,000 Units, Oral, Q7 DAYS    fluticasone, 2 Puff, Inhalation, BID    asa/apap/caffeine, 1 Tablet, Oral, Q6HRS PRN    Ibuprofen, Take  by mouth.   Objective:     Exam: BP 98/70 (BP Location: Left arm, Patient Position: Sitting, BP Cuff Size: Adult)   Pulse 71   Temp 36.5 °C (97.7 °F) (Temporal)   Wt 93.3 kg (205 lb 9.6 oz)   SpO2 98%  Body mass index is 32.2 kg/m².    Physical Exam:  Constitutional: Alert, no distress, well-groomed.  Skin: Warm, dry, good turgor, no rashes in visible areas.  Eye: Equal, round and reactive, conjunctiva clear, lids normal.  ENMT: Lips without lesions, good dentition, moist mucous membranes.  Neck: Trachea midline, no masses, no thyromegaly.  Respiratory: Unlabored respiratory effort, no  cough.  Abd: soft, non tender, non distended, normal BS  MSK: Normal gait, moves all extremities.  Neuro: Grossly non-focal.   Psych: Alert and oriented x3, normal affect and mood.    Labs: Reviewed    Assessment & Plan:   30 y.o. person with the following -    Assessment & Plan  1. Colitis: Acute.  - Diagnosed with colitis after being rushed to the ER a month ago due to a collapsed colon. Initial concerns included a ruptured ovarian cyst and appendicitis, but these were ruled out. A CT scan revealed inflammation. Treated with a 2-week course of Cipro and Flagyl. Despite completing the antibiotics, continues to experience nausea and dull pain, especially in the mornings. Reports no burning during urination.  - Appointment with a gastroenterologist today at 1:45 PM for further evaluation, which may include a colonoscopy.  - Monitor symptoms and response to treatment.  - Consider additional diagnostic tests if symptoms persist.  - Educate on dietary modifications to manage symptoms.    Follow-up  - Appointment with gastroenterologist today at 1:45 PM.      1. Colitis        2. History of colitis               No follow-ups on file.    Please note that this dictation was created using voice recognition software. I have made every reasonable attempt to correct obvious errors, but I expect that there are errors of grammar and possibly content that I did not discover before finalizing the note.             [1]   Allergies  Allergen Reactions    Cillins [Penicillins]     Codeine     Sulfa Drugs

## 2025-07-07 DIAGNOSIS — F41.9 ANXIETY: ICD-10-CM

## 2025-07-07 DIAGNOSIS — J02.9 PHARYNGITIS, UNSPECIFIED ETIOLOGY: ICD-10-CM

## 2025-07-07 DIAGNOSIS — K21.9 GASTROESOPHAGEAL REFLUX DISEASE WITHOUT ESOPHAGITIS: ICD-10-CM

## 2025-07-07 DIAGNOSIS — J45.40 MODERATE PERSISTENT ASTHMA WITHOUT COMPLICATION: ICD-10-CM

## 2025-07-07 NOTE — TELEPHONE ENCOUNTER
Received request via: Pharmacy    Was the patient seen in the last year in this department? Yes    Does the patient have an active prescription (recently filled or refills available) for medication(s) requested? No    Pharmacy Name: [facility]    Does the patient have correction Plus and need 100-day supply? (This applies to ALL medications) Patient does not have SCP

## 2025-07-08 RX ORDER — OMEPRAZOLE 20 MG/1
20 CAPSULE, DELAYED RELEASE ORAL DAILY
Qty: 30 CAPSULE | Refills: 2 | Status: SHIPPED | OUTPATIENT
Start: 2025-07-08

## 2025-07-08 RX ORDER — MONTELUKAST SODIUM 10 MG/1
10 TABLET ORAL DAILY
Qty: 30 TABLET | Refills: 2 | Status: SHIPPED | OUTPATIENT
Start: 2025-07-08

## 2025-07-08 RX ORDER — SUCRALFATE 1 G/1
1 TABLET ORAL
Qty: 90 TABLET | Refills: 0 | Status: SHIPPED | OUTPATIENT
Start: 2025-07-08

## 2025-07-08 RX ORDER — HYDROXYZINE HYDROCHLORIDE 25 MG/1
25 TABLET, FILM COATED ORAL 3 TIMES DAILY PRN
Qty: 30 TABLET | Refills: 5 | Status: SHIPPED | OUTPATIENT
Start: 2025-07-08

## 2025-07-16 ENCOUNTER — OFFICE VISIT (OUTPATIENT)
Dept: MEDICAL GROUP | Facility: MEDICAL CENTER | Age: 30
End: 2025-07-16
Attending: NURSE PRACTITIONER
Payer: MEDICAID

## 2025-07-16 VITALS
OXYGEN SATURATION: 96 % | TEMPERATURE: 95 F | RESPIRATION RATE: 16 BRPM | SYSTOLIC BLOOD PRESSURE: 122 MMHG | WEIGHT: 195.6 LBS | DIASTOLIC BLOOD PRESSURE: 80 MMHG | HEART RATE: 95 BPM | BODY MASS INDEX: 30.64 KG/M2

## 2025-07-16 DIAGNOSIS — R68.89 FLU-LIKE SYMPTOMS: Primary | ICD-10-CM

## 2025-07-16 DIAGNOSIS — R25.2 MUSCLE CRAMPS: ICD-10-CM

## 2025-07-16 DIAGNOSIS — G47.30 SLEEP APNEA, UNSPECIFIED TYPE: ICD-10-CM

## 2025-07-16 DIAGNOSIS — R10.84 GENERALIZED ABDOMINAL PAIN: ICD-10-CM

## 2025-07-16 LAB
FLUAV RNA SPEC QL NAA+PROBE: NEGATIVE
FLUBV RNA SPEC QL NAA+PROBE: NEGATIVE
RSV RNA SPEC QL NAA+PROBE: NEGATIVE
SARS-COV-2 RNA RESP QL NAA+PROBE: NEGATIVE

## 2025-07-16 PROCEDURE — 99213 OFFICE O/P EST LOW 20 MIN: CPT | Performed by: NURSE PRACTITIONER

## 2025-07-16 PROCEDURE — 3079F DIAST BP 80-89 MM HG: CPT | Performed by: NURSE PRACTITIONER

## 2025-07-16 PROCEDURE — 3074F SYST BP LT 130 MM HG: CPT | Performed by: NURSE PRACTITIONER

## 2025-07-16 PROCEDURE — 87637 SARSCOV2&INF A&B&RSV AMP PRB: CPT | Performed by: NURSE PRACTITIONER

## 2025-07-16 RX ORDER — CYCLOBENZAPRINE HCL 5 MG
5-10 TABLET ORAL 3 TIMES DAILY PRN
Qty: 90 TABLET | Refills: 0 | Status: SHIPPED | OUTPATIENT
Start: 2025-07-16

## 2025-07-16 RX ORDER — DICYCLOMINE HYDROCHLORIDE 10 MG/1
10 CAPSULE ORAL
Qty: 120 CAPSULE | Refills: 1 | Status: SHIPPED | OUTPATIENT
Start: 2025-07-16

## 2025-07-16 RX ORDER — ALBUTEROL SULFATE 90 UG/1
2 INHALANT RESPIRATORY (INHALATION) EVERY 6 HOURS PRN
Qty: 8.5 G | Refills: 1 | Status: SHIPPED | OUTPATIENT
Start: 2025-07-16

## 2025-07-16 RX ORDER — DEXTROMETHORPHAN HYDROBROMIDE AND PROMETHAZINE HYDROCHLORIDE 15; 6.25 MG/5ML; MG/5ML
5 SYRUP ORAL EVERY 4 HOURS PRN
Qty: 240 ML | Refills: 0 | Status: SHIPPED | OUTPATIENT
Start: 2025-07-16

## 2025-07-16 ASSESSMENT — FIBROSIS 4 INDEX: FIB4 SCORE: 0.28

## 2025-07-16 NOTE — PROGRESS NOTES
Verbal consent was acquired by the patient to use [employer] ambient listening note generation during this visit     Chief Complaint   Patient presents with    [facility] for 1 week symptoms- cough, fatigue,chills,yellow mucus.    Referral Needed     Sleep apnea,Pain management.       Subjective:     HPI:   History of Present Illness  The patient presents for evaluation of abdominal pain, cough, and sleep apnea.    They have been experiencing abdominal pain for approximately a week, which is rated as 10 out of 10 in severity. The pain is described as spasmodic and does not completely subside. There has been a weight loss of 11 pounds due to decreased food intake and increased water consumption. The patient often wakes up with dull pain, which can sometimes worsen throughout the day. They also report feeling like their intestines are swollen. There is a history of partially collapsed colon, and these symptoms have been ongoing for about 2 years. An episode of vomiting occurred on Monday, during which only water was vomited. Pain management has included tramadol, as their prescribed pain medication was ineffective. Diclofenac has not been tried before. A consultation with a gastroenterologist in 05/2025 included an EGD, which reported no significant findings. The patient is scheduled to see the gastroenterologist again in 09/2025 and anticipates a colonoscopy as the next step in their treatment plan.    The patient has been unwell for about a week, initially suspecting a sinus infection due to severe cheek pain and migraines. However, symptoms persisted, and yellow saliva was noticed. A cough and sore throat have been present, with slight improvement. Difficulty breathing and a sensation of suffocation, attributed to nasal congestion, have also been reported. Vicks has been used for relief, and an albuterol inhaler is available at home. Flovent has provided some relief, and montelukast is being taken  regularly. A COVID-19 test returned negative results, but it may have been conducted too early. Poor sleep has been experienced over the past week due to the illness.    The patient's psychiatrist has recommended a sleep apnea test.    The patient is not currently using the Ortho Evra birth control patch. They received a flu vaccine, but it was for the wrong strain. A broken tooth is present, and a dentist appointment is scheduled for Friday.    Diet: The patient reports decreased food intake and increased water consumption.  Sleep: The patient reports poor sleep over the past week due to illness.    PAST SURGICAL HISTORY:  - EGD in 05/2025        No problems updated.    ROS  See HPI     Allergies[1]    Current medicines (including changes today)  Current Medications[2]    Social History[3]    Patient Active Problem List    Diagnosis Date Noted    History of colitis 05/15/2025    Major depressive disorder 07/09/2024    Decreased appetite     Gastroesophageal reflux disease without esophagitis     Asthma 05/08/2024    Hypercholesterolemia 08/12/2023    Memory changes 10/12/2016    Bilateral foot pain 05/18/2016    Moderate cervical dysplasia 02/01/2016    Encounter for surveillance of contraceptives 02/01/2016    Encounter for routine gynecological examination 01/12/2015    Chronic low back pain 08/20/2014    Bilateral chronic knee pain 10/22/2013    Moderate persistent asthma     Family history of early CAD 03/07/2011    History of depression 03/07/2011       Family History   Problem Relation Age of Onset    Diabetes Mother     Heart Disease Mother         MI- age 46, 37 yo    Diabetes Maternal Grandmother     Heart Disease Maternal Grandmother     Hypertension Maternal Grandmother     Stroke Maternal Grandmother     Diabetes Maternal Grandfather     Heart Disease Maternal Grandfather     Hypertension Maternal Grandfather     Stroke Maternal Grandfather     Hypertension Father     Hyperlipidemia Father     Cancer  Paternal Grandfather           Objective:     /80 (BP Location: Left arm, Patient Position: Sitting, BP Cuff Size: Adult)   Pulse 95   Temp (!) 35 °C (95 °F) (Temporal)   Resp 16   Wt 88.7 kg (195 lb 9.6 oz)   SpO2 96%  Body mass index is 30.64 kg/m².    Physical Exam:  Physical Exam  Vitals reviewed.   Constitutional:       General: He is awake.      Appearance: Normal appearance. He is well-developed.   HENT:      Head: Normocephalic.   Eyes:      Conjunctiva/sclera: Conjunctivae normal.   Cardiovascular:      Rate and Rhythm: Normal rate.   Pulmonary:      Effort: Pulmonary effort is normal. No respiratory distress.   Musculoskeletal:      Cervical back: Neck supple.   Skin:     General: Skin is warm and dry.   Neurological:      Mental Status: He is alert and oriented to person, place, and time.   Psychiatric:         Mood and Affect: Mood normal.         Behavior: Behavior normal. Behavior is cooperative.              Assessment and Plan:     The following treatment plan was discussed:    Problem List Items Addressed This Visit    None  Visit Diagnoses         Flu-like symptoms    -  Primary    Relevant Medications    promethazine-dextromethorphan (PROMETHAZINE-DM) 6.25-15 MG/5ML syrup    albuterol 108 (90 Base) MCG/ACT Aero Soln inhalation aerosol    Other Relevant Orders    POCT Cepheid CoV-2, Flu A/B, RSV - PCR (Completed)      Sleep apnea, unspecified type        Relevant Orders    Referral to Pulmonary and Sleep Medicine      Generalized abdominal pain        Relevant Medications    dicyclomine (BENTYL) 10 MG Cap      Muscle cramps        Relevant Medications    cyclobenzaprine (FLEXERIL) 5 MG tablet            Assessment & Plan  1. Abdominal pain.  - Patient feels abdominal pain is likely related to colitis as this is what she was diagnosed with in hospital.  - Recent EGD in 05/2025 showed no significant findings; colonoscopy is likely to be recommended from her GI provider.  - Diclofenac and  muscle relaxers prescribed for abdominal cramping and pain as needed.  - Referral to gastroenterologist not needed as she is already established; recommended to avoid pain medications that cause constipation.    2. Cough.  - Cough is likely viral in nature; swab test conducted to rule out other causes.  - Prescription for cough syrup provided; may cause drowsiness, advised to take at night.  - Refill of Flovent provided if needed; maintain hydration to thin out mucus, use Mucinex if necessary.  - Rest and isolation recommended until recovery; appropriate treatment will be initiated if swab test returns positive.  - She is already improving so unlikely     3. Sleep apnea.  - Sleep apnea test ordered as recommended by psychiatrist.    Follow-up  - Follow up in 2 months or sooner if necessary.    Any change or worsening of signs or symptoms, patient encouraged to follow-up or report to emergency room for further evaluation. Patient verbalizes understanding and agrees.      PLEASE NOTE: This dictation was created using voice recognition software. I have made every reasonable attempt to correct obvious errors, but I expect that there are errors of grammar and possibly content that I did not discover before finalizing the note.       [1]   Allergies  Allergen Reactions    Cillins [Penicillins]     Codeine     Sulfa Drugs    [2]   Current Outpatient Medications   Medication Sig Dispense Refill    dicyclomine (BENTYL) 10 MG Cap Take 1 Capsule by mouth 4 Times a Day,Before Meals and at Bedtime. 120 Capsule 1    cyclobenzaprine (FLEXERIL) 5 MG tablet Take 1-2 Tablets by mouth 3 times a day as needed for Moderate Pain or Muscle Spasms. 90 Tablet 0    promethazine-dextromethorphan (PROMETHAZINE-DM) 6.25-15 MG/5ML syrup Take 5 mL by mouth every four hours as needed for Cough. 240 mL 0    albuterol 108 (90 Base) MCG/ACT Aero Soln inhalation aerosol Inhale 2 Puffs every 6 hours as needed for Shortness of Breath. 8.5 g 1     hydrOXYzine HCl (ATARAX) 25 MG Tab Take 1 Tablet by mouth 3 times a day as needed for Anxiety. 30 Tablet 5    sucralfate (CARAFATE) 1 g Tab Take 1 Tablet by mouth 3 times a day before meals. 90 Tablet 0    omeprazole (PRILOSEC) 20 MG delayed-release capsule Take 1 Capsule by mouth every day. 30 Capsule 2    montelukast (SINGULAIR) 10 MG Tab Take 1 Tablet by mouth every day. 30 Tablet 2    propranolol (INDERAL) 10 MG Tab Take 1 Tablet by mouth every day. 90 Tablet 2    DULoxetine (CYMBALTA) 60 MG Cap DR Particles delayed-release capsule Take 60 mg by mouth every day.      ergocalciferol (DRISDOL) 20717 UNIT capsule Take 1 Capsule by mouth every 7 days. 12 Capsule 3    fluticasone (FLOVENT HFA) 110 MCG/ACT Aerosol Inhale 2 Puffs 2 times a day. 1 Each 2     No current facility-administered medications for this visit.   [3]   Social History  Tobacco Use    Smoking status: Never    Smokeless tobacco: Never   Vaping Use    Vaping status: Never Used   Substance Use Topics    Alcohol use: Not Currently     Comment: occ    Drug use: Not Currently     Types: Marijuana     Comment: gummies

## 2025-07-22 NOTE — Clinical Note
REFERRAL APPROVAL NOTICE         Sent on July 22, 2025                   Kirsty Landry  [address] [place] [address]                   Dear Mr. Landry,    After a careful review of the medical information and benefit coverage, [facility] has processed your referral. See below for additional details.    If applicable, you must be actively enrolled with your insurance for coverage of the authorized service. If you have any questions regarding your coverage, please contact your insurance directly.    REFERRAL INFORMATION   Referral #:  [id number]  Referred-To Department    Referred-By Provider:  Pulmonary and Sleep Medicine    RAJIV Boston   [facility]      [address]  [place] [id number] [phone] [address], [address]  [place] [id number] [phone]    Referral Start Date:  07/16/2025  Referral End Date:   07/16/2026           SCHEDULING  If you do not already have an appointment, please call [phone] to make an appointment.   MORE INFORMATION  As a reminder, [facility] ownership has changed, meaning this location is now owned and operated by [facility]. As such, we want to clarify that our patients should expect to receive two separate bills for the services received at [facility] - one representing the [facility] facility fees as the owner of the establishment, and the other to represent the physician's services and subsequent fees. You can speak with your insurance carrier for a pricing estimate by calling the customer service number on the back of your card and ask about charges for a hospital outpatient visit.  If you do not already have a [email or web address] account, sign up at: [email or web address]  You can access your medical information, make appointments, see lab results, billing information,  and more.  If you have questions regarding this referral, please contact  the [facility] department at:             [phone]. Monday - Friday 7:30AM - 5:00PM.      Sincerely,  [facility]